# Patient Record
Sex: MALE | URBAN - METROPOLITAN AREA
[De-identification: names, ages, dates, MRNs, and addresses within clinical notes are randomized per-mention and may not be internally consistent; named-entity substitution may affect disease eponyms.]

---

## 2018-02-01 ENCOUNTER — APPOINTMENT (RX ONLY)
Dept: URBAN - METROPOLITAN AREA CLINIC 79 | Facility: CLINIC | Age: 78
Setting detail: DERMATOLOGY
End: 2018-02-01

## 2018-02-01 DIAGNOSIS — Z48.02 ENCOUNTER FOR REMOVAL OF SUTURES: ICD-10-CM

## 2018-02-01 PROBLEM — Z85.828 PERSONAL HISTORY OF OTHER MALIGNANT NEOPLASM OF SKIN: Status: ACTIVE | Noted: 2018-02-01

## 2018-02-01 PROCEDURE — 99024 POSTOP FOLLOW-UP VISIT: CPT

## 2018-02-01 PROCEDURE — ? SUTURE REMOVAL (GLOBAL PERIOD)

## 2018-02-01 ASSESSMENT — LOCATION DETAILED DESCRIPTION DERM
LOCATION DETAILED: RIGHT INFERIOR POSTERIOR NECK
LOCATION DETAILED: RIGHT INFERIOR POSTERIOR NECK

## 2018-02-01 ASSESSMENT — LOCATION SIMPLE DESCRIPTION DERM
LOCATION SIMPLE: POSTERIOR NECK
LOCATION SIMPLE: POSTERIOR NECK

## 2018-02-01 ASSESSMENT — LOCATION ZONE DERM
LOCATION ZONE: NECK
LOCATION ZONE: NECK

## 2018-02-01 NOTE — PROCEDURE: SUTURE REMOVAL (GLOBAL PERIOD)
Add 29886 Cpt? (Important Note: In 2017 The Use Of 80956 Is Being Tracked By Cms To Determine Future Global Period Reimbursement For Global Periods): yes
Body Location Override (Optional - Billing Will Still Be Based On Selected Body Map Location If Applicable): right neck
Detail Level: Detailed

## 2018-02-13 ENCOUNTER — APPOINTMENT (RX ONLY)
Dept: URBAN - METROPOLITAN AREA CLINIC 79 | Facility: CLINIC | Age: 78
Setting detail: DERMATOLOGY
End: 2018-02-13

## 2018-02-13 VITALS — HEART RATE: 53 BPM | SYSTOLIC BLOOD PRESSURE: 142 MMHG | DIASTOLIC BLOOD PRESSURE: 80 MMHG

## 2018-02-13 PROBLEM — C44.319 BASAL CELL CARCINOMA OF SKIN OF OTHER PARTS OF FACE: Status: ACTIVE | Noted: 2018-02-13

## 2018-02-13 PROCEDURE — ? MOHS SURGERY

## 2018-02-13 PROCEDURE — 14041 TIS TRNFR F/C/C/M/N/A/G/H/F: CPT

## 2018-02-13 PROCEDURE — 17312 MOHS ADDL STAGE: CPT

## 2018-02-13 PROCEDURE — 17311 MOHS 1 STAGE H/N/HF/G: CPT

## 2018-02-13 NOTE — HPI: PROCEDURE (MOHS)
Has The Growth Been Previously Biopsied?: has been previously biopsied
Body Location Override (Optional): Right lateral forehead

## 2018-02-13 NOTE — PROCEDURE: MOHS SURGERY
Advancement Flap (Single) Text: Given the location of the defect and the proximity to free margins a single advancement flap was deemed most appropriate.  Using a sterile surgical marker, an appropriate advancement flap was drawn incorporating the defect and placing the expected incisions within the relaxed skin tension lines where possible.    The area thus outlined was incised deep to adipose tissue with a #15 scalpel blade.  The skin margins were undermined to an appropriate distance in all directions utilizing iris scissors.

## 2018-02-13 NOTE — PROCEDURE: MOHS SURGERY
V-Y Flap Text: Given the location of the defect, shape of the defect and the proximity to free margins a V-Y flap was deemed most appropriate.  Using a sterile surgical marker, an appropriate advancement flap was drawn incorporating the defect and placing the expected incisions within the relaxed skin tension lines where possible.    The area thus outlined was incised deep to adipose tissue with a #15 scalpel blade.  The skin margins were undermined to an appropriate distance in all directions utilizing iris scissors.

## 2018-02-13 NOTE — PROCEDURE: MOHS SURGERY
Burow's Advancement Flap Text: Given the location of the defect and the proximity to free margins a Burow's advancement flap was deemed most appropriate.  Using a sterile surgical marker, the appropriate advancement flap was drawn incorporating the defect and placing the expected incisions within the relaxed skin tension lines where possible.    The area thus outlined was incised deep to adipose tissue with a #15 scalpel blade.  The skin margins were undermined to an appropriate distance in all directions utilizing iris scissors.

## 2018-02-13 NOTE — PROCEDURE: MOHS SURGERY
Mohs Method Verbiage: An incision at a 90 degree angle following the standard Mohs approach was done and the specimen was harvested as a microscopic controlled layer.

## 2018-02-13 NOTE — PROCEDURE: MOHS SURGERY
Body Location Override (Optional - Billing Will Still Be Based On Selected Body Map Location If Applicable): right lateral forehead

## 2018-02-13 NOTE — PROCEDURE: MOHS SURGERY
O-L Flap Text: Given the location of the defect, shape of the defect and the proximity to free margins an O-L flap was deemed most appropriate.  Using a sterile surgical marker, an appropriate advancement flap was drawn incorporating the defect and placing the expected incisions within the relaxed skin tension lines where possible.    The area thus outlined was incised deep to adipose tissue with a #15 scalpel blade.  The skin margins were undermined to an appropriate distance in all directions utilizing iris scissors.

## 2018-02-13 NOTE — PROCEDURE: MOHS SURGERY
Crescentic Advancement Flap Text: Given the location of the defect and the proximity to free margins a crescentic advancement flap was deemed most appropriate.  Using a sterile surgical marker, the appropriate advancement flap was drawn incorporating the defect and placing the expected incisions within the relaxed skin tension lines where possible.    The area thus outlined was incised deep to adipose tissue with a #15 scalpel blade.  The skin margins were undermined to an appropriate distance in all directions utilizing iris scissors.

## 2018-02-13 NOTE — PROCEDURE: MOHS SURGERY
Consent 1/Introductory Paragraph: The rationale for Mohs was explained to the patient and consent was obtained. The risks, benefits and alternatives to therapy were discussed in detail. Specifically, the risks of infection, scarring, bleeding, prolonged wound healing, incomplete removal, allergy to anesthesia, nerve injury and recurrence were addressed. Prior to the procedure, the treatment site was clearly identified and confirmed by the patient using a mirror. All components of Universal Protocol/PAUSE Rule completed.

## 2018-02-13 NOTE — PROCEDURE: MOHS SURGERY
Advancement Flap (Double) Text: Given the location of the defect and the proximity to free margins a double advancement flap was deemed most appropriate.  Using a sterile surgical marker, the appropriate advancement flaps were drawn incorporating the defect and placing the expected incisions within the relaxed skin tension lines where possible.    The area thus outlined was incised deep to adipose tissue with a #15 scalpel blade.  The skin margins were undermined to an appropriate distance in all directions utilizing iris scissors.

## 2018-07-02 ENCOUNTER — NEW PATIENT (OUTPATIENT)
Dept: URBAN - METROPOLITAN AREA CLINIC 64 | Facility: CLINIC | Age: 78
End: 2018-07-02
Payer: COMMERCIAL

## 2018-07-02 DIAGNOSIS — H52.03 HYPERMETROPIA, BILATERAL: ICD-10-CM

## 2018-07-02 DIAGNOSIS — H25.13 AGE-RELATED NUCLEAR CATARACT, BILATERAL: Primary | ICD-10-CM

## 2018-07-02 PROCEDURE — 92015 DETERMINE REFRACTIVE STATE: CPT | Performed by: OPTOMETRIST

## 2018-07-02 PROCEDURE — 92002 INTRM OPH EXAM NEW PATIENT: CPT | Performed by: OPTOMETRIST

## 2018-07-02 ASSESSMENT — VISUAL ACUITY
OD: 20/20
OS: 20/20

## 2018-07-02 ASSESSMENT — INTRAOCULAR PRESSURE
OS: 14
OD: 13

## 2019-01-01 NOTE — PROCEDURE: MOHS SURGERY
0 Trilobed Flap Text: The defect edges were debeveled with a #15 scalpel blade.  Given the location of the defect and the proximity to free margins a trilobed flap was deemed most appropriate.  Using a sterile surgical marker, an appropriate trilobed flap drawn around the defect.    The area thus outlined was incised deep to adipose tissue with a #15 scalpel blade.  The skin margins were undermined to an appropriate distance in all directions utilizing iris scissors.

## 2019-05-16 ENCOUNTER — HISTORICAL (OUTPATIENT)
Dept: RADIOLOGY | Facility: HOSPITAL | Age: 79
End: 2019-05-16

## 2019-08-29 ENCOUNTER — HISTORICAL (OUTPATIENT)
Dept: RADIOLOGY | Facility: HOSPITAL | Age: 79
End: 2019-08-29

## 2021-02-03 ENCOUNTER — HISTORICAL (OUTPATIENT)
Dept: ADMINISTRATIVE | Facility: HOSPITAL | Age: 81
End: 2021-02-03

## 2022-04-11 ENCOUNTER — HISTORICAL (OUTPATIENT)
Dept: ADMINISTRATIVE | Facility: HOSPITAL | Age: 82
End: 2022-04-11
Payer: MEDICARE

## 2022-04-27 VITALS
DIASTOLIC BLOOD PRESSURE: 86 MMHG | HEIGHT: 69 IN | SYSTOLIC BLOOD PRESSURE: 139 MMHG | BODY MASS INDEX: 23.99 KG/M2 | WEIGHT: 162 LBS

## 2022-05-16 NOTE — PROGRESS NOTES
Subjective:       Patient ID: Aleisha Hernandez is a 81 y.o. male.    Chief Complaint: Dizziness (Would like referral for orthopedic)    HPI   New patient, presents to the clinic to establish primary care.  Past medical, family and social histories reviewed.  Patient has a couple of issues today.  First, is having some dizziness, feelings of being off balance, very much sounds like vertigo, off and on for some time.  Also has tinnitus.  Reports no hearing loss, no syncopal episodes, no headache.  No past history of vertigo.  Also, has chronic left knee issues, mostly associated with his patella.  Has had significant effusions of his knee recently, had to have a drain that urgent care.  Had cortisone injection into the knee space, not very much improvement.  Otherwise, no chronic medical conditions or complaints today.    Review of Systems   Constitutional: Negative.  Negative for fatigue and fever.   HENT: Negative.  Negative for sore throat and trouble swallowing.    Eyes: Negative.  Negative for redness and visual disturbance.   Respiratory: Negative.  Negative for chest tightness and shortness of breath.    Cardiovascular: Negative.  Negative for chest pain.   Gastrointestinal: Negative.  Negative for abdominal pain, blood in stool, change in bowel habit, nausea and change in bowel habit.   Endocrine: Negative.  Negative for cold intolerance, heat intolerance and polyuria.   Genitourinary: Negative.    Musculoskeletal: Negative.  Negative for arthralgias, gait problem and joint swelling.   Integumentary:  Negative for rash. Negative.   Neurological: Negative.  Negative for dizziness, weakness and headaches.   Psychiatric/Behavioral: Negative.  Negative for agitation and dysphoric mood.         Objective:     Vitals:    05/17/22 1350   BP: 132/77   BP Location: Left arm   Patient Position: Sitting   BP Method: Small (Automatic)   Pulse: (!) 57   Resp: 18   Temp: 97 °F (36.1 °C)   TempSrc: Tympanic   SpO2: 98%  "  Weight: 73 kg (161 lb)   Height: 5' 9" (1.753 m)   Body mass index is 23.78 kg/m².     Physical Exam  Constitutional:       Appearance: Normal appearance.   Eyes:      Conjunctiva/sclera: Conjunctivae normal.   Cardiovascular:      Rate and Rhythm: Normal rate and regular rhythm.   Pulmonary:      Effort: Pulmonary effort is normal.      Breath sounds: Normal breath sounds.   Skin:     General: Skin is warm and dry.   Neurological:      Mental Status: He is alert.   Psychiatric:         Mood and Affect: Mood normal.         Behavior: Behavior normal.           Lab Results   Component Value Date    WBC 11.5 03/22/2019    RBC 2.55 (L) 03/22/2019    HGB 8.4 (L) 03/23/2019    HCT 26.1 (L) 03/23/2019    MCV 90.6 03/22/2019    MCH 29.0 03/22/2019     03/22/2019     03/22/2019    K 4.5 03/22/2019    BUN 11.0 03/22/2019    CREATININE 0.83 03/22/2019    AST 15 03/22/2019    ALT 14 03/22/2019    BILITOT 0.3 03/22/2019     Assessment:     Problem List Items Addressed This Visit    None     Visit Diagnoses     Establishing care with new doctor, encounter for    -  Primary    Chronic pain of left knee        Relevant Orders    Ambulatory referral/consult to Orthopedics    Vertigo        Relevant Orders    Ambulatory referral/consult to ENT    Tinnitus, unspecified laterality               Plan:           Will place 2 referrals, 1st to Orthopedics, Dr. Torres for his chronic left knee pain.  Second referral will be to otolaryngology, Dr. Madera for his vertigo.  Next wellness exam will be in 6 months with pre visit labs.  "

## 2022-05-17 ENCOUNTER — OFFICE VISIT (OUTPATIENT)
Dept: PRIMARY CARE CLINIC | Facility: CLINIC | Age: 82
End: 2022-05-17
Payer: MEDICARE

## 2022-05-17 VITALS
HEIGHT: 69 IN | RESPIRATION RATE: 18 BRPM | OXYGEN SATURATION: 98 % | WEIGHT: 161 LBS | BODY MASS INDEX: 23.85 KG/M2 | HEART RATE: 57 BPM | SYSTOLIC BLOOD PRESSURE: 132 MMHG | TEMPERATURE: 97 F | DIASTOLIC BLOOD PRESSURE: 77 MMHG

## 2022-05-17 DIAGNOSIS — Z76.89 ESTABLISHING CARE WITH NEW DOCTOR, ENCOUNTER FOR: Primary | ICD-10-CM

## 2022-05-17 DIAGNOSIS — R42 DIZZINESS: ICD-10-CM

## 2022-05-17 DIAGNOSIS — Z13.0 SCREENING FOR DEFICIENCY ANEMIA: ICD-10-CM

## 2022-05-17 DIAGNOSIS — Z00.00 WELLNESS EXAMINATION: Primary | ICD-10-CM

## 2022-05-17 DIAGNOSIS — Z12.5 SCREENING FOR PROSTATE CANCER: ICD-10-CM

## 2022-05-17 DIAGNOSIS — H93.19 TINNITUS, UNSPECIFIED LATERALITY: ICD-10-CM

## 2022-05-17 DIAGNOSIS — M25.562 CHRONIC PAIN OF LEFT KNEE: ICD-10-CM

## 2022-05-17 DIAGNOSIS — Z13.1 SCREENING FOR DIABETES MELLITUS: ICD-10-CM

## 2022-05-17 DIAGNOSIS — R42 VERTIGO: ICD-10-CM

## 2022-05-17 DIAGNOSIS — G89.29 CHRONIC PAIN OF LEFT KNEE: ICD-10-CM

## 2022-05-17 DIAGNOSIS — Z13.6 SCREENING FOR CARDIOVASCULAR CONDITION: ICD-10-CM

## 2022-05-17 DIAGNOSIS — Z13.29 SCREENING FOR THYROID DISORDER: ICD-10-CM

## 2022-05-17 PROCEDURE — 99214 PR OFFICE/OUTPT VISIT, EST, LEVL IV, 30-39 MIN: ICD-10-PCS | Mod: ,,, | Performed by: GENERAL PRACTICE

## 2022-05-17 PROCEDURE — 99214 OFFICE O/P EST MOD 30 MIN: CPT | Mod: ,,, | Performed by: GENERAL PRACTICE

## 2022-05-17 RX ORDER — MELOXICAM 7.5 MG/1
TABLET ORAL
COMMUNITY
Start: 2022-05-06 | End: 2023-09-27

## 2022-05-17 RX ORDER — ACYCLOVIR 200 MG/1
CAPSULE ORAL
COMMUNITY
Start: 2022-05-16

## 2022-05-25 ENCOUNTER — TELEPHONE (OUTPATIENT)
Dept: PRIMARY CARE CLINIC | Facility: CLINIC | Age: 82
End: 2022-05-25
Payer: MEDICARE

## 2022-05-25 NOTE — TELEPHONE ENCOUNTER
----- Message from Neena Franco sent at 5/25/2022  8:39 AM CDT -----  Regarding: Advice  Type:  Needs Medical Advice    Who Called: Patient  Symptoms (please be specific):    How long has patient had these symptoms:    Pharmacy name and phone #:    Would the patient rather a call back or a response via [facility]? Call  Best Call Back Number: [phone]  Additional Information: Dr Arreaga referred pt to Dr Valderrama for his knee and he states he would like a second opinion. He states he saw Dr Valderrama before and he is the one who said his knee was inoperable.

## 2022-05-25 NOTE — TELEPHONE ENCOUNTER
Spoke to patient regarding message. I stated that I sent a new referral to Dr. Dieter Torres. He spoke understanding a will wait to hear from them to schedule an appointment

## 2022-10-11 ENCOUNTER — TELEPHONE (OUTPATIENT)
Dept: PRIMARY CARE CLINIC | Facility: CLINIC | Age: 82
End: 2022-10-11
Payer: MEDICARE

## 2022-10-11 NOTE — TELEPHONE ENCOUNTER
Pt does have wellness in November. NO sooner appt. Have pt's name and # down if someone cancels for sooner appt. Pt was told if not better to go got [facility] [place] or call us.

## 2022-10-12 ENCOUNTER — TELEPHONE (OUTPATIENT)
Dept: PRIMARY CARE CLINIC | Facility: CLINIC | Age: 82
End: 2022-10-12
Payer: MEDICARE
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
## 2022-12-15 ENCOUNTER — PATIENT MESSAGE (OUTPATIENT)
Dept: RESEARCH | Facility: HOSPITAL | Age: 82
End: 2022-12-15
Payer: MEDICARE

## 2023-09-20 ENCOUNTER — TELEPHONE (OUTPATIENT)
Dept: PRIMARY CARE CLINIC | Facility: CLINIC | Age: 83
End: 2023-09-20
Payer: MEDICARE

## 2023-09-20 RX ORDER — FLUOROURACIL 50 MG/G
1 CREAM TOPICAL 2 TIMES DAILY
COMMUNITY
Start: 2023-09-20

## 2023-09-20 NOTE — TELEPHONE ENCOUNTER
Are there any outstanding task in patient chart?  N     2. Do we have outstanding/pending referrals?  N     3. Has the patient been seen in an ER, Urgent Care, or admitted since last visit?  N     4. Has patient seen any other healthcare providers since last visit?  N     5. Has patient had any blood work or xrays done since last visit?  N   6. Is the patient's pneumonia vaccine current?  Prevnar 13:11/3/20  Pneumonia 20:n/a  Pneumonia 23:11/9/21    7. When was the patient's colonoscopy?  N/a  8. When was the patient's last mamogram?  N/a  9. When was the patient's last cervical screening/PAP smear?  N/a  10. When was the patient's last bone scan?  N/a  11. When was the patient's last diabetic screening?  A1c: n/a  Micro:n/a  Eye Exam: n/a

## 2023-09-27 ENCOUNTER — OFFICE VISIT (OUTPATIENT)
Dept: PRIMARY CARE CLINIC | Facility: CLINIC | Age: 83
End: 2023-09-27
Payer: MEDICARE

## 2023-09-27 VITALS
TEMPERATURE: 98 F | OXYGEN SATURATION: 95 % | WEIGHT: 159.63 LBS | BODY MASS INDEX: 23.64 KG/M2 | SYSTOLIC BLOOD PRESSURE: 126 MMHG | DIASTOLIC BLOOD PRESSURE: 78 MMHG | HEART RATE: 89 BPM | RESPIRATION RATE: 20 BRPM | HEIGHT: 69 IN

## 2023-09-27 DIAGNOSIS — N40.0 BENIGN PROSTATIC HYPERPLASIA WITHOUT LOWER URINARY TRACT SYMPTOMS: ICD-10-CM

## 2023-09-27 DIAGNOSIS — Z85.828 HISTORY OF MOHS MICROGRAPHIC SURGERY FOR SKIN CANCER: ICD-10-CM

## 2023-09-27 DIAGNOSIS — Z86.2 HISTORY OF ANEMIA: ICD-10-CM

## 2023-09-27 DIAGNOSIS — E78.5 HYPERLIPIDEMIA, UNSPECIFIED HYPERLIPIDEMIA TYPE: ICD-10-CM

## 2023-09-27 DIAGNOSIS — R79.9 ABNORMAL BLOOD CHEMISTRY: ICD-10-CM

## 2023-09-27 DIAGNOSIS — Z76.89 ENCOUNTER TO ESTABLISH CARE WITH NEW DOCTOR: Primary | ICD-10-CM

## 2023-09-27 DIAGNOSIS — Z98.890 HISTORY OF MOHS MICROGRAPHIC SURGERY FOR SKIN CANCER: ICD-10-CM

## 2023-09-27 DIAGNOSIS — R73.09 ABNORMAL GLUCOSE LEVEL: ICD-10-CM

## 2023-09-27 DIAGNOSIS — Z00.00 MEDICARE ANNUAL WELLNESS VISIT, SUBSEQUENT: ICD-10-CM

## 2023-09-27 PROCEDURE — 99203 PR OFFICE/OUTPT VISIT, NEW, LEVL III, 30-44 MIN: ICD-10-PCS | Mod: ,,, | Performed by: STUDENT IN AN ORGANIZED HEALTH CARE EDUCATION/TRAINING PROGRAM

## 2023-09-27 PROCEDURE — 99203 OFFICE O/P NEW LOW 30 MIN: CPT | Mod: ,,, | Performed by: STUDENT IN AN ORGANIZED HEALTH CARE EDUCATION/TRAINING PROGRAM
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
## 2023-10-11 ENCOUNTER — LAB VISIT (OUTPATIENT)
Dept: LAB | Facility: HOSPITAL | Age: 83
End: 2023-10-11
Attending: STUDENT IN AN ORGANIZED HEALTH CARE EDUCATION/TRAINING PROGRAM
Payer: MEDICARE

## 2023-10-11 DIAGNOSIS — R73.09 ABNORMAL GLUCOSE LEVEL: ICD-10-CM

## 2023-10-11 DIAGNOSIS — R79.9 ABNORMAL BLOOD CHEMISTRY: ICD-10-CM

## 2023-10-11 DIAGNOSIS — Z86.2 HISTORY OF ANEMIA: ICD-10-CM

## 2023-10-11 DIAGNOSIS — E78.5 HYPERLIPIDEMIA, UNSPECIFIED HYPERLIPIDEMIA TYPE: ICD-10-CM

## 2023-10-11 DIAGNOSIS — Z00.00 MEDICARE ANNUAL WELLNESS VISIT, SUBSEQUENT: ICD-10-CM

## 2023-10-11 LAB
ALBUMIN SERPL-MCNC: 3.7 G/DL (ref 3.4–4.8)
ALBUMIN/GLOB SERPL: 1.5 RATIO (ref 1.1–2)
ALP SERPL-CCNC: 56 UNIT/L (ref 40–150)
ALT SERPL-CCNC: 14 UNIT/L (ref 0–55)
APPEARANCE UR: CLEAR
AST SERPL-CCNC: 18 UNIT/L (ref 5–34)
BACTERIA #/AREA URNS AUTO: NORMAL /HPF
BASOPHILS # BLD AUTO: 0.05 X10(3)/MCL
BASOPHILS NFR BLD AUTO: 0.8 %
BILIRUB SERPL-MCNC: 0.7 MG/DL
BILIRUB UR QL STRIP.AUTO: NEGATIVE
BUN SERPL-MCNC: 17.5 MG/DL (ref 8.4–25.7)
CALCIUM SERPL-MCNC: 8.6 MG/DL (ref 8.8–10)
CHLORIDE SERPL-SCNC: 107 MMOL/L (ref 98–107)
CHOLEST SERPL-MCNC: 238 MG/DL
CHOLEST/HDLC SERPL: 4 {RATIO} (ref 0–5)
CO2 SERPL-SCNC: 27 MMOL/L (ref 23–31)
COLOR UR AUTO: YELLOW
CREAT SERPL-MCNC: 0.96 MG/DL (ref 0.73–1.18)
EOSINOPHIL # BLD AUTO: 0.15 X10(3)/MCL (ref 0–0.9)
EOSINOPHIL NFR BLD AUTO: 2.3 %
ERYTHROCYTE [DISTWIDTH] IN BLOOD BY AUTOMATED COUNT: 14.9 % (ref 11.5–17)
EST. AVERAGE GLUCOSE BLD GHB EST-MCNC: 108.3 MG/DL
GFR SERPLBLD CREATININE-BSD FMLA CKD-EPI: >60 MLS/MIN/1.73/M2
GLOBULIN SER-MCNC: 2.4 GM/DL (ref 2.4–3.5)
GLUCOSE SERPL-MCNC: 92 MG/DL (ref 82–115)
GLUCOSE UR QL STRIP.AUTO: NEGATIVE
HBA1C MFR BLD: 5.4 %
HCT VFR BLD AUTO: 43.1 % (ref 42–52)
HDLC SERPL-MCNC: 58 MG/DL (ref 35–60)
HGB BLD-MCNC: 13.7 G/DL (ref 14–18)
IMM GRANULOCYTES # BLD AUTO: 0.03 X10(3)/MCL (ref 0–0.04)
IMM GRANULOCYTES NFR BLD AUTO: 0.5 %
KETONES UR QL STRIP.AUTO: NEGATIVE
LDLC SERPL CALC-MCNC: 165 MG/DL (ref 50–140)
LEUKOCYTE ESTERASE UR QL STRIP.AUTO: NEGATIVE
LYMPHOCYTES # BLD AUTO: 2.3 X10(3)/MCL (ref 0.6–4.6)
LYMPHOCYTES NFR BLD AUTO: 35 %
MCH RBC QN AUTO: 29.8 PG (ref 27–31)
MCHC RBC AUTO-ENTMCNC: 31.8 G/DL (ref 33–36)
MCV RBC AUTO: 93.7 FL (ref 80–94)
MONOCYTES # BLD AUTO: 0.63 X10(3)/MCL (ref 0.1–1.3)
MONOCYTES NFR BLD AUTO: 9.6 %
NEUTROPHILS # BLD AUTO: 3.41 X10(3)/MCL (ref 2.1–9.2)
NEUTROPHILS NFR BLD AUTO: 51.8 %
NITRITE UR QL STRIP.AUTO: NEGATIVE
NRBC BLD AUTO-RTO: 0 %
PH UR STRIP.AUTO: 6 [PH]
PLATELET # BLD AUTO: 234 X10(3)/MCL (ref 130–400)
PMV BLD AUTO: 13.1 FL (ref 7.4–10.4)
POTASSIUM SERPL-SCNC: 4.6 MMOL/L (ref 3.5–5.1)
PROT SERPL-MCNC: 6.1 GM/DL (ref 5.8–7.6)
PROT UR QL STRIP.AUTO: NEGATIVE
RBC # BLD AUTO: 4.6 X10(6)/MCL (ref 4.7–6.1)
RBC #/AREA URNS AUTO: <5 /HPF
RBC UR QL AUTO: NEGATIVE
SODIUM SERPL-SCNC: 140 MMOL/L (ref 136–145)
SP GR UR STRIP.AUTO: 1.02 (ref 1–1.03)
SQUAMOUS #/AREA URNS AUTO: <5 /HPF
TRIGL SERPL-MCNC: 76 MG/DL (ref 34–140)
TSH SERPL-ACNC: 2.36 UIU/ML (ref 0.35–4.94)
UROBILINOGEN UR STRIP-ACNC: 0.2
VLDLC SERPL CALC-MCNC: 15 MG/DL
WBC # SPEC AUTO: 6.57 X10(3)/MCL (ref 4.5–11.5)
WBC #/AREA URNS AUTO: <5 /HPF

## 2023-10-11 PROCEDURE — 83036 HEMOGLOBIN GLYCOSYLATED A1C: CPT

## 2023-10-11 PROCEDURE — 85025 COMPLETE CBC W/AUTO DIFF WBC: CPT

## 2023-10-11 PROCEDURE — 36415 COLL VENOUS BLD VENIPUNCTURE: CPT

## 2023-10-11 PROCEDURE — 81001 URINALYSIS AUTO W/SCOPE: CPT

## 2023-10-11 PROCEDURE — 84443 ASSAY THYROID STIM HORMONE: CPT

## 2023-10-11 PROCEDURE — 80061 LIPID PANEL: CPT

## 2023-10-11 PROCEDURE — 80053 COMPREHEN METABOLIC PANEL: CPT

## 2023-10-31 ENCOUNTER — OFFICE VISIT (OUTPATIENT)
Dept: PRIMARY CARE CLINIC | Facility: CLINIC | Age: 83
End: 2023-10-31
Payer: MEDICARE

## 2023-10-31 VITALS
WEIGHT: 162.81 LBS | HEIGHT: 69 IN | HEART RATE: 60 BPM | BODY MASS INDEX: 24.11 KG/M2 | TEMPERATURE: 98 F | RESPIRATION RATE: 17 BRPM | SYSTOLIC BLOOD PRESSURE: 158 MMHG | DIASTOLIC BLOOD PRESSURE: 86 MMHG | OXYGEN SATURATION: 99 %

## 2023-10-31 DIAGNOSIS — G47.62 NOCTURNAL LEG CRAMPS: ICD-10-CM

## 2023-10-31 DIAGNOSIS — Z00.00 MEDICARE ANNUAL WELLNESS VISIT, SUBSEQUENT: Primary | ICD-10-CM

## 2023-10-31 DIAGNOSIS — R03.0 ELEVATED BLOOD PRESSURE READING WITHOUT DIAGNOSIS OF HYPERTENSION: ICD-10-CM

## 2023-10-31 PROCEDURE — G0439 PPPS, SUBSEQ VISIT: HCPCS | Mod: ,,, | Performed by: STUDENT IN AN ORGANIZED HEALTH CARE EDUCATION/TRAINING PROGRAM

## 2023-10-31 PROCEDURE — G0439 PR MEDICARE ANNUAL WELLNESS SUBSEQUENT VISIT: ICD-10-PCS | Mod: ,,, | Performed by: STUDENT IN AN ORGANIZED HEALTH CARE EDUCATION/TRAINING PROGRAM

## 2023-10-31 RX ORDER — GABAPENTIN 100 MG/1
100 CAPSULE ORAL NIGHTLY
Qty: 30 CAPSULE | Refills: 11 | Status: SHIPPED | OUTPATIENT
Start: 2023-10-31 | End: 2023-11-10 | Stop reason: SINTOL

## 2023-10-31 NOTE — PROCEDURE: MOHS SURGERY
Chief complaint:   Chief Complaint   Patient presents with   • Follow-up     Fuv lashonda       Vitals:  Visit Vitals  /82   Pulse 87   Wt (!) 138.3 kg (305 lb)   SpO2 99%   BMI 41.37 kg/m²       HISTORY OF PRESENT ILLNESS     HPI   The patient presents for evaluation of his obstructive sleep apnea.  He was diagnosed with severe sleep apnea in 2022 with a respiratory event index of 36.1 and a minimum oxygen saturation of 79%.  Is being treated with auto-titrating CPAP +5-15 cm with expiratory pressure relief of 3 cm through a nasal pillow interface.  He states that treatment is going well and he denies mask or mouth leaks. He is not aware of snoring and is not told that he does. He feels a little better but expected to feel better than he does. He goes to bed at 10:30-11 PM and he awakens for the day at 7:15 AM. He can wake up at 9 AM on weekends but does not feel better with sleep extension.     CPAP/BiPAP DOWNLOAD DATA   % use > 4 hours 99%   AHI 0.6   Mean Usage (time in hours) 9:07    Other significant problems:  Patient Active Problem List    Diagnosis Date Noted   • LASHONDA (obstructive sleep apnea) 08/01/2022     Priority: Medium   • Impaired fasting glucose 08/30/2023     Priority: Low   • Racing heart beat 08/30/2023     Priority: Low   • Obesity, morbid, BMI 40.0-49.9 (CMD) 01/29/2018     Priority: Low   • Essential hypertension 01/16/2018     Priority: Low       PAST MEDICAL, FAMILY AND SOCIAL HISTORY     Medications:  Current Outpatient Medications   Medication Sig Dispense Refill   • losartan (COZAAR) 100 MG tablet Take 1 tablet by mouth daily. 90 tablet 3   • hydroCHLOROthiazide (HYDRODIURIL) 25 MG tablet Take 1 tablet by mouth daily. 90 tablet 3   • amLODIPine (NORVASC) 10 MG tablet Take 1 tablet by mouth daily. 90 tablet 3   • propRANolol (INDERAL LA) 60 MG 24 hr capsule Take 1 capsule by mouth nightly. 30 capsule 5     No current facility-administered medications for this visit.        Allergies:  ALLERGIES:  No Known Allergies    Past Medical  History/Surgeries:  Past Medical History:   Diagnosis Date   • BREE (obstructive sleep apnea)        No past surgical history on file.    Family History:  Family History   Problem Relation Age of Onset   • Patient is unaware of any medical problems Mother    • Patient is unaware of any medical problems Father        Social History:  Social History     Tobacco Use   • Smoking status: Never     Passive exposure: Never   • Smokeless tobacco: Never   Substance Use Topics   • Alcohol use: Yes     Alcohol/week: 0.0 standard drinks of alcohol       REVIEW OF SYSTEMS     Review of Systems    PHYSICAL EXAM     Physical Exam   The patient is an obese male in no acute distress.  He appears alert and oriented x3.  He has patent nasopharyngeal passages.  There is a Mallampati 3 appearance of the posterior pharynx with encroachment of the posterior oral airway.  He has atrophic tonsils and no mandibular retrognathia.    ASSESSMENT/PLAN     Obstructive sleep apnea.  The patient has an excellent response to CPAP treatment.  He is compliant with his machine and is symptomatically improved.  Based on his download, I would like to change his pressures to +7-13 cm.  I will renew his supply order and see him back in 1 year long-term evaluation.  Was told to keep track of his symptoms of daytime sleepiness and if they worsen, we could consider using non amphetamine stimulants but he does not require these medications at present.   Ear Wedge Repair Text: A wedge excision was completed by carrying down an excision through the full thickness of the ear and cartilage with an inward facing Burow's triangle. The wound was then closed in a layered fashion.

## 2023-10-31 NOTE — ASSESSMENT & PLAN NOTE
Reviewed recent wellness labs. See below for health maintenance.    Vaccinations:   - Flu: received 9/5/23   - Pneumonia: PCV13 2020, PPSV23 2021   - Shingles (>50): pt reports receiving x 2 2018   - Tdap: pt reports receiving 2020 for trip to [place]   - COVID: received original series  Screening:   - Prostate (>45):    - Lung Ca. Screening (50-80 with >20 pack yrs current or quit <15 yrs):    - Colon Ca. Screening (>45): pt thinks last c-scope was at age 72 and was told no further needed, pt says no polyps   - AAA (65-75 if ever smoked): n/a

## 2023-10-31 NOTE — PROGRESS NOTES
Patient ID: [id number]     Chief Complaint: Medicare AWV    HPI:     Aleisha Hernandez is a 83 y.o. male here today for a Medicare Wellness.     Takes zolpidem 10 mg PRN (bought in [place]). He only uses when leg cramps are waking him up multiple nights in a row. Only uses one every few months. Has been dealing with this issue 8-10 yrs. Has tried prescriptions for cramps in past w/o success. Has tried multiple supplements w/o success. Tried tonic water w/o success. Eating tsp of mustard helps.    Opioid Screening: Patient medication list reviewed, patient is not taking prescription opioids. Patient is not using additional opioids than prescribed. Patient is at low risk of substance abuse based on this opioid use history.     No past medical history on file.     Past Surgical History:   Procedure Laterality Date    ANTERIOR CRUCIATE LIGAMENT REPAIR      Shoulder    KNEE ARTHROSCOPY Right     PROSTATE SURGERY         Review of patient's allergies indicates:   Allergen Reactions    Codeine Itching, Palpitations, Rash and Shortness Of Breath       No outpatient medications have been marked as taking for the 10/31/23 encounter (Office Visit) with Murphy Canales MD.       Social History     Socioeconomic History    Marital status:    Tobacco Use    Smoking status: Never    Smokeless tobacco: Never   Substance and Sexual Activity    Alcohol use: Yes     Alcohol/week: 1.0 standard drink of alcohol     Types: 1 Shots of liquor per week    Drug use: Never    Sexual activity: Not Currently     Social Determinants of Health     Financial Resource Strain: Low Risk  (10/31/2023)    Overall Financial Resource Strain (CARDIA)     Difficulty of Paying Living Expenses: Not hard at all   Food Insecurity: No Food Insecurity (10/31/2023)    Hunger Vital Sign     Worried About Running Out of Food in the Last Year: Never true     Ran Out of Food in the Last Year: Never true   Transportation Needs: No Transportation Needs  (10/31/2023)    PRAPARE - Transportation     Lack of Transportation (Medical): No     Lack of Transportation (Non-Medical): No   Physical Activity: Insufficiently Active (10/31/2023)    Exercise Vital Sign     Days of Exercise per Week: 3 days     Minutes of Exercise per Session: 20 min   Stress: No Stress Concern Present (10/31/2023)    [employer] [employer] - Occupational Stress Questionnaire     Feeling of Stress : Not at all   Social Connections: Unknown (10/31/2023)    Social Connection and Isolation Panel [NHANES]     Frequency of Communication with Friends and Family: Three times a week     Frequency of Social Gatherings with Friends and Family: Three times a week   Housing Stability: Unknown (10/31/2023)    Housing Stability Vital Sign     Unable to Pay for Housing in the Last Year: No     Number of Places Lived in the Last Year: 1        History reviewed. No pertinent family history.     Patient Care Team:  Murphy Canales MD as PCP - General (Internal Medicine)       Subjective:     Review of Systems   Constitutional:  Negative for chills and fever.   HENT:  Negative for sinus pressure/congestion and sore throat.    Respiratory:  Negative for cough, shortness of breath and wheezing.    Cardiovascular:  Negative for chest pain and leg swelling.   Gastrointestinal:  Negative for abdominal pain, nausea and vomiting.   Genitourinary:  Negative for dysuria and hematuria.   Musculoskeletal:  Positive for leg pain (nocturnal leg cramps). Negative for arthralgias and myalgias.   Integumentary:  Negative for rash.   Neurological:  Negative for dizziness and syncope.   Hematological:  Negative for adenopathy.   Psychiatric/Behavioral:  Negative for confusion. The patient is not nervous/anxious.        Patient Reported Health Risk Assessment  What is your age?: 80 or older  Are you male or female?: Male  During the past four weeks, how much have you been bothered by emotional problems such as  feeling anxious, depressed, irritable, sad, or downhearted and blue?: Not at all  During the past five weeks, has your physical and/or emotional health limited your social activities with family, friends, neighbors, or groups?: Not at all  During the past four weeks, how much bodily pain have you generally had?: Very mild pain  During the past four weeks, was someone available to help if you needed and wanted help?: Yes, as much as I wanted  During the past four weeks, what was the hardest physical activity you could do for at least two minutes?: Light  Can you get to places out of walking distance without help?  (For example, can you travel alone on buses or taxis, or drive your own car?): Yes  Can you go shopping for groceries or clothes without someone's help?: Yes  Can you prepare your own meals?: Yes  Can you do your own housework without help?: Yes  Because of any health problems, do you need the help of another person with your personal care needs such as eating, bathing, dressing, or getting around the house?: No  Can you handle your own money without help?: Yes  During the past four weeks, how would you rate your health in general?: Very good  How have things been going for you during the past four weeks?: Pretty well  Are you having difficulties driving your car?: No  Do you always fasten your seat belt when you are in a car?: Yes, usually  How often in the past four weeks have you been bothered by falling or dizzy when standing up?: Never  How often in the past four weeks have you been bothered by sexual problems?: Never  How often in the past four weeks have you been bothered by trouble eating well?: Never  How often in the past four weeks have you been bothered by teeth or denture problems?: Never  How often in the past four weeks have you been bothered with problems using the telephone?: Never  How often in the past four weeks have you been bothered by tiredness or fatigue?: Never  Have you fallen two  "or more times in the past year?: No  Are you afraid of falling?: No  Are you a smoker?: No  During the past four weeks, how many drinks of wine, beer, or other alcoholic beverages did you have?: One drink or less per week  Do you exercise for about 20 minutes three or more days a week?: Yes, some of the time  Have you been given any information to help you with hazards in your house that might hurt you?: Yes  Have you been given any information to help you with keeping track of your medications?: Yes  How often do you have trouble taking medicines the way you've been told to take them?: I always take them as prescribed  How confident are you that you can control and manage most of your health problems?: Very confident  What is your race? (Check all that apply.):     Objective:     BP (!) 158/86 (BP Location: Left arm)   Pulse 60   Temp 97.9 °F (36.6 °C)   Resp 17   Ht 5' 9" (1.753 m)   Wt 73.8 kg (162 lb 12.8 oz)   SpO2 99%   BMI 24.04 kg/m²     Physical Exam  Vitals and nursing note reviewed.   Constitutional:       General: He is not in acute distress.  HENT:      Head: Normocephalic.      Nose: No rhinorrhea.   Eyes:      Conjunctiva/sclera: Conjunctivae normal.      Pupils: Pupils are equal, round, and reactive to light.   Cardiovascular:      Rate and Rhythm: Normal rate and regular rhythm.   Pulmonary:      Effort: Pulmonary effort is normal.      Breath sounds: Normal breath sounds.   Abdominal:      Palpations: Abdomen is soft.      Tenderness: There is no abdominal tenderness.   Musculoskeletal:         General: No deformity or signs of injury.   Skin:     General: Skin is warm and dry.   Neurological:      General: No focal deficit present.      Mental Status: He is alert. Mental status is at baseline.   Psychiatric:         Mood and Affect: Mood normal.         Behavior: Behavior normal.              No data to display                  10/31/2023     9:20 AM 9/27/2023     1:00 PM 5/17/2022 "     1:30 PM   Fall Risk Assessment - Outpatient   Mobility Status Ambulatory Ambulatory Ambulatory   Number of falls 0 0 0   Identified as fall risk False False            Depression Screening  Over the past two weeks, has the patient felt down, depressed, or hopeless?: No  Over the past two weeks, has the patient felt little interest or pleasure in doing things?: No  Assessment/Plan:     1. Medicare annual wellness visit, subsequent  Assessment & Plan:  Reviewed recent wellness labs. See below for health maintenance.    Vaccinations:   - Flu: received 9/5/23   - Pneumonia: PCV13 2020, PPSV23 2021   - Shingles (>50): pt reports receiving x 2 2018   - Tdap: pt reports receiving 2020 for trip to [place]   - COVID: received original series  Screening:   - Prostate (>45):    - Lung Ca. Screening (50-80 with >20 pack yrs current or quit <15 yrs):    - Colon Ca. Screening (>45): pt thinks last c-scope was at age 72 and was told no further needed, pt says no polyps   - AAA (65-75 if ever smoked): n/a      2. Nocturnal leg cramps  Assessment & Plan:  Start trial of gabapentin 100 mg before bed, OK to try 200 mg if not effective    Orders:  -     gabapentin (NEURONTIN) 100 MG capsule; Take 1 capsule (100 mg total) by mouth every evening.  Dispense: 30 capsule; Refill: 11    3. Elevated blood pressure reading without diagnosis of hypertension  Comments:  suspect sec to taking sleep aid last night, will drop off or send BP logs in 2 weeks       Medicare Annual Wellness and Personalized Prevention Plan:   Fall Risk + Home Safety + Hearing Impairment + Depression Screen + Opioid and Substance Abuse Screening + Cognitive Impairment Screen + Health Risk Assessment all reviewed.     Health Maintenance Topics with due status: Not Due       Topic Last Completion Date    Lipid Panel 10/11/2023      The patient's Health Maintenance was reviewed and the following appears to be due at this time:   Health Maintenance Due   Topic  Date Due    TETANUS VACCINE  Never done    Shingles Vaccine (1 of 2) Never done    RSV Vaccine (Age 60+) (1 - 1-dose 60+ series) Never done    COVID-19 Vaccine (3 - 2023-24 season) 09/01/2023       Advance Care Planning   I attest to discussing Advance Care Planning with patient and/or family member.  Education was provided including the importance of the Health Care Power of , Advance Directives, and/or [place] documentation.  The patient expressed understanding to the importance of this information and discussion.       Follow up in about 1 year (around 10/31/2024) for Wellness. In addition to their scheduled follow up, the patient has also been instructed to follow up on as needed basis.

## 2023-11-06 ENCOUNTER — TELEPHONE (OUTPATIENT)
Dept: PRIMARY CARE CLINIC | Facility: CLINIC | Age: 83
End: 2023-11-06
Payer: MEDICARE

## 2023-11-06 DIAGNOSIS — G47.62 NOCTURNAL LEG CRAMPS: Primary | ICD-10-CM

## 2023-11-06 DIAGNOSIS — G47.00 INSOMNIA, UNSPECIFIED TYPE: ICD-10-CM

## 2023-11-06 NOTE — TELEPHONE ENCOUNTER
----- Message from Carmine Holguin sent at 11/6/2023  8:30 AM CST -----  .Type:  Needs Medical Advice    Who Called: pt  Symptoms (please be specific):    How long has patient had these symptoms:    Pharmacy name and phone #:    Would the patient rather a call back or a response via [facility]? Call back  Best Call Back Number: [phone]  Additional Information: pt called to state medication not working and that it has exasperated the issue pt stated he stopped taking medication gabapentin (NEURONTIN) 100 MG capsule, and is wanting to discuss alternative mediation

## 2023-11-10 RX ORDER — DILTIAZEM HYDROCHLORIDE 30 MG/1
30 TABLET, FILM COATED ORAL NIGHTLY
Qty: 30 TABLET | Refills: 11 | Status: SHIPPED | OUTPATIENT
Start: 2023-11-10 | End: 2024-11-09

## 2023-11-10 RX ORDER — ZOLPIDEM TARTRATE 10 MG/1
10 TABLET ORAL NIGHTLY PRN
Qty: 30 TABLET | Refills: 2 | Status: SHIPPED | OUTPATIENT
Start: 2023-11-10

## 2023-11-10 NOTE — TELEPHONE ENCOUNTER
If gabapentin caused side effects or worsening symptoms, then we'll discontinue that one.     The next two options include:  Vitamin B complex (many OTC options available) and vitamin E 800 units before bed. Some studies have shown this to be effective.  Diltiazem which is a prescription medication for blood pressure and heart issues. Used at low doses, it can help leg cramps.     Let me know which option the pt prefers.    Thanks  Murphy Canales MD

## 2023-11-10 NOTE — TELEPHONE ENCOUNTER
Start trial of diltiazem 30 mg before bed for nocturnal leg cramps.    Pt has been taking Ambien 10 mg nightly PRN for insomnia for years. He acquired this while in [place]. Requesting refill. Known to tolerate. Sending Rx today.    Murphy Canales MD

## 2023-12-08 ENCOUNTER — TELEPHONE (OUTPATIENT)
Dept: PRIMARY CARE CLINIC | Facility: CLINIC | Age: 83
End: 2023-12-08
Payer: MEDICARE

## 2023-12-08 ENCOUNTER — PATIENT MESSAGE (OUTPATIENT)
Dept: PRIMARY CARE CLINIC | Facility: CLINIC | Age: 83
End: 2023-12-08
Payer: MEDICARE

## 2023-12-08 VITALS — SYSTOLIC BLOOD PRESSURE: 127 MMHG | DIASTOLIC BLOOD PRESSURE: 72 MMHG

## 2023-12-08 NOTE — TELEPHONE ENCOUNTER
Reviewed home BP log.     SBP rare upper 140s to low 150s (<15% of readings)  SBP typically 120s-130s    DBP rarely above 85    Estimated average 130-135/80-82    Murphy Canales MD

## 2024-01-26 ENCOUNTER — TELEPHONE (OUTPATIENT)
Dept: PRIMARY CARE CLINIC | Facility: CLINIC | Age: 84
End: 2024-01-26
Payer: MEDICARE

## 2024-01-26 NOTE — TELEPHONE ENCOUNTER
Please call patient to advise of clearance appt.   Suad weems when I called.   2/28/2024 @ [facility]

## 2024-02-05 PROBLEM — Z00.00 MEDICARE ANNUAL WELLNESS VISIT, SUBSEQUENT: Status: RESOLVED | Noted: 2023-10-31 | Resolved: 2024-02-05

## 2024-02-28 ENCOUNTER — OFFICE VISIT (OUTPATIENT)
Dept: PRIMARY CARE CLINIC | Facility: CLINIC | Age: 84
End: 2024-02-28
Payer: MEDICARE

## 2024-02-28 DIAGNOSIS — Z01.818 PRE-OP EXAMINATION: Primary | ICD-10-CM

## 2024-02-28 PROCEDURE — 99213 OFFICE O/P EST LOW 20 MIN: CPT | Mod: ,,, | Performed by: STUDENT IN AN ORGANIZED HEALTH CARE EDUCATION/TRAINING PROGRAM

## 2024-02-28 NOTE — ASSESSMENT & PLAN NOTE
Health status is excellent.   No change in health since Wellness Visit 10/2023. Labs at that time were good.   Capable of >5 METS  RCRI score is 0    Blood pressure is elevated today. Some element of white coat HTN as his home BP logs are typically at goal. BP reading from home today is 140/71. We will monitor moving forward, but low level of concern regarding BP at this time.     Overall, this patient is low risk for a low risk procedure such as cataracts surgery. From PCP perspective, cleared to proceed as scheduled.

## 2024-02-28 NOTE — PROGRESS NOTES
Subjective:       Patient ID: Aleisha Hernandez is a 83 y.o. male.    No past medical history on file.     Chief Complaint: Surgical Clearance    Presents for pre-op clearance. Scheduled to undergo cataract surgery on March 5th, left eye first then will have right eye done about 2 weeks later.     Feels well. No complaints. Remains very active. No change to overall health since last visit.      Review of Systems   Constitutional:  Negative for chills and fever.   HENT:  Negative for sinus pressure/congestion and sore throat.    Respiratory:  Negative for cough, shortness of breath and wheezing.    Cardiovascular:  Negative for chest pain and leg swelling.   Gastrointestinal:  Negative for abdominal pain, nausea and vomiting.   Genitourinary:  Negative for dysuria and hematuria.   Musculoskeletal:  Negative for arthralgias and myalgias.   Integumentary:  Negative for rash.   Neurological:  Negative for dizziness and syncope.   Hematological:  Negative for adenopathy.   Psychiatric/Behavioral:  Negative for confusion. The patient is not nervous/anxious.            Objective:      Physical Exam  Vitals and nursing note reviewed.   Constitutional:       General: He is not in acute distress.  HENT:      Head: Normocephalic.      Nose: No rhinorrhea.   Eyes:      Conjunctiva/sclera: Conjunctivae normal.      Pupils: Pupils are equal, round, and reactive to light.   Cardiovascular:      Rate and Rhythm: Normal rate and regular rhythm.   Pulmonary:      Effort: Pulmonary effort is normal.      Breath sounds: Normal breath sounds.   Abdominal:      Palpations: Abdomen is soft.      Tenderness: There is no abdominal tenderness.   Musculoskeletal:         General: No deformity or signs of injury.   Skin:     General: Skin is warm and dry.   Neurological:      General: No focal deficit present.      Mental Status: He is alert. Mental status is at baseline.   Psychiatric:         Mood and Affect: Mood normal.         Behavior:  Behavior normal.           Assessment & Plan:   1. Pre-op examination  Assessment & Plan:  Health status is excellent.   No change in health since Wellness Visit 10/2023. Labs at that time were good.   Capable of >5 METS  RCRI score is 0    Blood pressure is elevated today. Some element of white coat HTN as his home BP logs are typically at goal. BP reading from home today is 140/71. We will monitor moving forward, but low level of concern regarding BP at this time.     Overall, this patient is low risk for a low risk procedure such as cataracts surgery. From PCP perspective, cleared to proceed as scheduled.         Keep scheduled wellness visit. In addition to their scheduled follow up, the patient has also been instructed to follow up on as needed basis.

## 2024-02-29 ENCOUNTER — PATIENT MESSAGE (OUTPATIENT)
Dept: PRIMARY CARE CLINIC | Facility: CLINIC | Age: 84
End: 2024-02-29
Payer: MEDICARE

## 2024-02-29 VITALS
BODY MASS INDEX: 23.11 KG/M2 | TEMPERATURE: 98 F | HEART RATE: 99 BPM | SYSTOLIC BLOOD PRESSURE: 140 MMHG | DIASTOLIC BLOOD PRESSURE: 71 MMHG | RESPIRATION RATE: 18 BRPM | HEIGHT: 69 IN | OXYGEN SATURATION: 98 % | WEIGHT: 156 LBS

## 2024-03-10 ENCOUNTER — PATIENT MESSAGE (OUTPATIENT)
Dept: PRIMARY CARE CLINIC | Facility: CLINIC | Age: 84
End: 2024-03-10
Payer: MEDICARE

## 2024-03-11 VITALS — SYSTOLIC BLOOD PRESSURE: 111 MMHG | DIASTOLIC BLOOD PRESSURE: 73 MMHG

## 2024-05-10 ENCOUNTER — OFFICE VISIT (OUTPATIENT)
Dept: PRIMARY CARE CLINIC | Facility: CLINIC | Age: 84
End: 2024-05-10
Payer: MEDICARE

## 2024-05-10 VITALS
TEMPERATURE: 98 F | HEART RATE: 55 BPM | RESPIRATION RATE: 18 BRPM | BODY MASS INDEX: 23.25 KG/M2 | HEIGHT: 69 IN | WEIGHT: 157 LBS | OXYGEN SATURATION: 98 % | DIASTOLIC BLOOD PRESSURE: 84 MMHG | SYSTOLIC BLOOD PRESSURE: 149 MMHG

## 2024-05-10 DIAGNOSIS — M25.50 MULTIPLE JOINT PAIN: Primary | ICD-10-CM

## 2024-05-10 PROCEDURE — 99213 OFFICE O/P EST LOW 20 MIN: CPT | Mod: ,,, | Performed by: STUDENT IN AN ORGANIZED HEALTH CARE EDUCATION/TRAINING PROGRAM

## 2024-05-10 RX ORDER — PREDNISOLONE ACETATE 10 MG/ML
1 SUSPENSION/ DROPS OPHTHALMIC
COMMUNITY
Start: 2024-04-09

## 2024-05-10 RX ORDER — IBUPROFEN 600 MG/1
600 TABLET ORAL EVERY 8 HOURS PRN
Qty: 30 TABLET | Refills: 0 | Status: SHIPPED | OUTPATIENT
Start: 2024-05-10

## 2024-05-10 RX ORDER — METHYLPREDNISOLONE 4 MG/1
TABLET ORAL
Qty: 21 EACH | Refills: 0 | Status: SHIPPED | OUTPATIENT
Start: 2024-05-10 | End: 2024-05-31

## 2024-05-10 NOTE — PROGRESS NOTES
"Subjective:       Patient ID: Aleisha Hernandez is a 83 y.o. male.    -------------------------------------    Nocturnal leg cramps        Chief Complaint: Back Pain and Knee Pain    Had recent episode of severe back pain about 5 days ago. Sharp "shooting" pain from R side of side down R leg. RLE gave out, nearly fell (caught himself on fence). Used heating pad, muscle relaxers, OTC meds and got relief within a day or two.     Next day, developed knee pain. Severe with swelling. Improved with ice and ibuprofen. Returned to normal level of activity.     Next day developed R jaw pain, worse if he tried to open mouth wide. This has resolved.     This morning, woke up with R hand and wrist pain. Very stiff. Pain worse with movement. Improved with ice.         ROS (-) for neck pain. Cervical disease unlikely  Denies history of gout  No joint erythema  No recent travel        Review of Systems   Constitutional:  Negative for chills and fever.   HENT:  Negative for sinus pressure/congestion and sore throat.    Respiratory:  Negative for cough, shortness of breath and wheezing.    Cardiovascular:  Negative for chest pain and leg swelling.   Gastrointestinal:  Negative for abdominal pain, nausea and vomiting.   Genitourinary:  Negative for dysuria and hematuria.   Musculoskeletal:  Positive for arthralgias, back pain and leg pain. Negative for myalgias.   Integumentary:  Negative for rash.   Neurological:  Negative for dizziness and syncope.   Hematological:  Negative for adenopathy.   Psychiatric/Behavioral:  Negative for confusion. The patient is not nervous/anxious.            Objective:      Physical Exam  Vitals and nursing note reviewed.   Constitutional:       General: He is not in acute distress.  HENT:      Head: Normocephalic.      Nose: No rhinorrhea.   Eyes:      Conjunctiva/sclera: Conjunctivae normal.      Pupils: Pupils are equal, round, and reactive to light.   Cardiovascular:      Rate and Rhythm: Normal " rate and regular rhythm.   Pulmonary:      Effort: Pulmonary effort is normal.      Breath sounds: Normal breath sounds.   Abdominal:      Palpations: Abdomen is soft.      Tenderness: There is no abdominal tenderness.   Musculoskeletal:         General: No deformity or signs of injury.   Skin:     General: Skin is warm and dry.   Neurological:      General: No focal deficit present.      Mental Status: He is alert. Mental status is at baseline.   Psychiatric:         Mood and Affect: Mood normal.         Behavior: Behavior normal.           Assessment & Plan:   1. Multiple joint pain  Assessment & Plan:  Etiology unclear. At this time presume flare up of OA. Rx Medrol Dosepack. If symptoms not improving in 2-3 days consider further workup to include inflammatory markers, possibly imaging of lumbar spine.    Orders:  -     methylPREDNISolone (MEDROL DOSEPACK) 4 mg tablet; use as directed  Dispense: 21 each; Refill: 0  -     ibuprofen (ADVIL,MOTRIN) 600 MG tablet; Take 1 tablet (600 mg total) by mouth every 8 (eight) hours as needed for Pain.  Dispense: 30 tablet; Refill: 0        Keep scheduled f/u. In addition to their scheduled follow up, the patient has also been instructed to follow up on as needed basis.

## 2024-05-12 NOTE — ASSESSMENT & PLAN NOTE
Etiology unclear. At this time presume flare up of OA. Rx Medrol Dosepack. If symptoms not improving in 2-3 days consider further workup to include inflammatory markers, possibly imaging of lumbar spine.

## 2024-06-17 ENCOUNTER — OFFICE VISIT (OUTPATIENT)
Dept: PRIMARY CARE CLINIC | Facility: CLINIC | Age: 84
End: 2024-06-17
Payer: MEDICARE

## 2024-06-17 VITALS
RESPIRATION RATE: 18 BRPM | WEIGHT: 156 LBS | DIASTOLIC BLOOD PRESSURE: 67 MMHG | OXYGEN SATURATION: 98 % | TEMPERATURE: 98 F | BODY MASS INDEX: 23.11 KG/M2 | SYSTOLIC BLOOD PRESSURE: 124 MMHG | HEART RATE: 63 BPM | HEIGHT: 69 IN

## 2024-06-17 DIAGNOSIS — D69.1 ABNORMAL PLATELETS: ICD-10-CM

## 2024-06-17 DIAGNOSIS — D64.9 MILD ANEMIA: ICD-10-CM

## 2024-06-17 DIAGNOSIS — Z01.818 PRE-OP EXAMINATION: Primary | ICD-10-CM

## 2024-06-17 DIAGNOSIS — Z01.818 PRE-OP EVALUATION: ICD-10-CM

## 2024-06-17 PROCEDURE — 99213 OFFICE O/P EST LOW 20 MIN: CPT | Mod: ,,, | Performed by: STUDENT IN AN ORGANIZED HEALTH CARE EDUCATION/TRAINING PROGRAM

## 2024-06-17 NOTE — PROGRESS NOTES
Subjective:       Patient ID: Aleisha Hernandez is a 83 y.o. male.    -------------------------------------    Nocturnal leg cramps        Chief Complaint: Follow-up (Surgical Clearance )    Presents for pre-op clearance for eyelid surgery (B/L upper blepharotomy, ptosis repair). He has no acute complaints. Overall health remains excellent.      Review of Systems   Constitutional:  Negative for chills and fever.   HENT:  Negative for sinus pressure/congestion and sore throat.    Respiratory:  Negative for cough, shortness of breath and wheezing.    Cardiovascular:  Negative for chest pain and leg swelling.   Gastrointestinal:  Negative for abdominal pain, nausea and vomiting.   Genitourinary:  Negative for dysuria and hematuria.   Musculoskeletal:  Negative for arthralgias and myalgias.   Integumentary:  Negative for rash.   Neurological:  Negative for dizziness and syncope.   Hematological:  Negative for adenopathy.   Psychiatric/Behavioral:  Negative for confusion. The patient is not nervous/anxious.            Objective:      Physical Exam  Vitals and nursing note reviewed.   Constitutional:       General: He is not in acute distress.  HENT:      Head: Normocephalic.      Nose: No rhinorrhea.   Eyes:      Conjunctiva/sclera: Conjunctivae normal.      Pupils: Pupils are equal, round, and reactive to light.   Cardiovascular:      Rate and Rhythm: Normal rate and regular rhythm.   Pulmonary:      Effort: Pulmonary effort is normal.      Breath sounds: Normal breath sounds.   Abdominal:      Palpations: Abdomen is soft.      Tenderness: There is no abdominal tenderness.   Musculoskeletal:         General: No deformity or signs of injury.   Skin:     General: Skin is warm and dry.   Neurological:      General: No focal deficit present.      Mental Status: He is alert. Mental status is at baseline.   Psychiatric:         Mood and Affect: Mood normal.         Behavior: Behavior normal.           Assessment & Plan:   1.  Pre-op examination  Assessment & Plan:  Health status is excellent.   Reviewed CBC, CMP, PT, PTT all WNL  EKG acceptable (NSR, LAFB)  Capable of >5 METS  RCRI score is 0    Overall, this patient is low risk for a low risk procedure such as blepharotomy and ptosis repair. From PCP perspective, cleared to proceed as scheduled.     Orders:  -     CBC Auto Differential; Future; Expected date: 06/17/2024  -     Comprehensive Metabolic Panel; Future; Expected date: 06/17/2024  -     APTT; Future; Expected date: 06/17/2024  -     Protime-INR; Future; Expected date: 06/17/2024  -     POCT EKG 12-LEAD (Manually Resulted by Ordering Provider)    2. Pre-op evaluation  -     CBC Auto Differential; Future; Expected date: 06/17/2024  -     Comprehensive Metabolic Panel; Future; Expected date: 06/17/2024  -     APTT; Future; Expected date: 06/17/2024  -     Protime-INR; Future; Expected date: 06/17/2024  -     POCT EKG 12-LEAD (Manually Resulted by Ordering Provider)    3. Mild anemia  -     CBC Auto Differential; Future; Expected date: 06/17/2024  -     APTT; Future; Expected date: 06/17/2024  -     Protime-INR; Future; Expected date: 06/17/2024    4. Abnormal platelets  -     APTT; Future; Expected date: 06/17/2024  -     Protime-INR; Future; Expected date: 06/17/2024        Keep scheduled f/u. In addition to their scheduled follow up, the patient has also been instructed to follow up on as needed basis.

## 2024-06-18 ENCOUNTER — LAB VISIT (OUTPATIENT)
Dept: LAB | Facility: HOSPITAL | Age: 84
End: 2024-06-18
Attending: STUDENT IN AN ORGANIZED HEALTH CARE EDUCATION/TRAINING PROGRAM
Payer: MEDICARE

## 2024-06-18 DIAGNOSIS — D64.9 MILD ANEMIA: ICD-10-CM

## 2024-06-18 DIAGNOSIS — D69.1 ABNORMAL PLATELETS: ICD-10-CM

## 2024-06-18 DIAGNOSIS — Z01.818 PRE-OP EVALUATION: ICD-10-CM

## 2024-06-18 LAB
ALBUMIN SERPL-MCNC: 3.9 G/DL (ref 3.4–4.8)
ALBUMIN/GLOB SERPL: 1.6 RATIO (ref 1.1–2)
ALP SERPL-CCNC: 61 UNIT/L (ref 40–150)
ALT SERPL-CCNC: 15 UNIT/L (ref 0–55)
ANION GAP SERPL CALC-SCNC: 5 MEQ/L
APTT PPP: 30 SECONDS (ref 23.2–33.7)
AST SERPL-CCNC: 15 UNIT/L (ref 5–34)
BASOPHILS # BLD AUTO: 0.05 X10(3)/MCL
BASOPHILS NFR BLD AUTO: 0.5 %
BILIRUB SERPL-MCNC: 0.7 MG/DL
BUN SERPL-MCNC: 17.5 MG/DL (ref 8.4–25.7)
CALCIUM SERPL-MCNC: 9.2 MG/DL (ref 8.8–10)
CHLORIDE SERPL-SCNC: 107 MMOL/L (ref 98–107)
CO2 SERPL-SCNC: 28 MMOL/L (ref 23–31)
CREAT SERPL-MCNC: 0.98 MG/DL (ref 0.73–1.18)
CREAT/UREA NIT SERPL: 18
EOSINOPHIL # BLD AUTO: 0.09 X10(3)/MCL (ref 0–0.9)
EOSINOPHIL NFR BLD AUTO: 0.9 %
ERYTHROCYTE [DISTWIDTH] IN BLOOD BY AUTOMATED COUNT: 15 % (ref 11.5–17)
GFR SERPLBLD CREATININE-BSD FMLA CKD-EPI: >60 ML/MIN/1.73/M2
GLOBULIN SER-MCNC: 2.5 GM/DL (ref 2.4–3.5)
GLUCOSE SERPL-MCNC: 102 MG/DL (ref 82–115)
HCT VFR BLD AUTO: 44.1 % (ref 42–52)
HGB BLD-MCNC: 14.1 G/DL (ref 14–18)
IMM GRANULOCYTES # BLD AUTO: 0.02 X10(3)/MCL (ref 0–0.04)
IMM GRANULOCYTES NFR BLD AUTO: 0.2 %
INR PPP: 1.1
LYMPHOCYTES # BLD AUTO: 2.23 X10(3)/MCL (ref 0.6–4.6)
LYMPHOCYTES NFR BLD AUTO: 22.9 %
MCH RBC QN AUTO: 29.7 PG (ref 27–31)
MCHC RBC AUTO-ENTMCNC: 32 G/DL (ref 33–36)
MCV RBC AUTO: 92.8 FL (ref 80–94)
MONOCYTES # BLD AUTO: 0.81 X10(3)/MCL (ref 0.1–1.3)
MONOCYTES NFR BLD AUTO: 8.3 %
NEUTROPHILS # BLD AUTO: 6.53 X10(3)/MCL (ref 2.1–9.2)
NEUTROPHILS NFR BLD AUTO: 67.2 %
NRBC BLD AUTO-RTO: 0 %
PLATELET # BLD AUTO: 302 X10(3)/MCL (ref 130–400)
PMV BLD AUTO: 12.9 FL (ref 7.4–10.4)
POTASSIUM SERPL-SCNC: 4.8 MMOL/L (ref 3.5–5.1)
PROT SERPL-MCNC: 6.4 GM/DL (ref 5.8–7.6)
PROTHROMBIN TIME: 14.1 SECONDS (ref 12.5–14.5)
RBC # BLD AUTO: 4.75 X10(6)/MCL (ref 4.7–6.1)
SODIUM SERPL-SCNC: 140 MMOL/L (ref 136–145)
WBC # BLD AUTO: 9.73 X10(3)/MCL (ref 4.5–11.5)

## 2024-06-18 PROCEDURE — 85025 COMPLETE CBC W/AUTO DIFF WBC: CPT

## 2024-06-18 PROCEDURE — 85610 PROTHROMBIN TIME: CPT

## 2024-06-18 PROCEDURE — 36415 COLL VENOUS BLD VENIPUNCTURE: CPT

## 2024-06-18 PROCEDURE — 85730 THROMBOPLASTIN TIME PARTIAL: CPT

## 2024-06-18 PROCEDURE — 80053 COMPREHEN METABOLIC PANEL: CPT

## 2024-06-20 ENCOUNTER — TELEPHONE (OUTPATIENT)
Dept: PRIMARY CARE CLINIC | Facility: CLINIC | Age: 84
End: 2024-06-20
Payer: MEDICARE

## 2024-06-20 NOTE — ASSESSMENT & PLAN NOTE
Health status is excellent.   Reviewed CBC, CMP, PT, PTT all WNL  EKG acceptable (NSR, LAFB)  Capable of >5 METS  RCRI score is 0    Overall, this patient is low risk for a low risk procedure such as blepharotomy and ptosis repair. From PCP perspective, cleared to proceed as scheduled.

## 2024-06-24 ENCOUNTER — PATIENT MESSAGE (OUTPATIENT)
Dept: PRIMARY CARE CLINIC | Facility: CLINIC | Age: 84
End: 2024-06-24
Payer: MEDICARE

## 2024-07-10 ENCOUNTER — TELEPHONE (OUTPATIENT)
Dept: PRIMARY CARE CLINIC | Facility: CLINIC | Age: 84
End: 2024-07-10
Payer: MEDICARE

## 2024-07-23 ENCOUNTER — HOSPITAL ENCOUNTER (OUTPATIENT)
Dept: RADIOLOGY | Facility: HOSPITAL | Age: 84
Discharge: HOME | End: 2024-07-23
Attending: OTOLARYNGOLOGY
Payer: MEDICARE

## 2024-07-23 DIAGNOSIS — Z01.818 OTHER SPECIFIED PRE-OPERATIVE EXAMINATION: ICD-10-CM

## 2024-07-23 DIAGNOSIS — Z01.818 OTHER SPECIFIED PRE-OPERATIVE EXAMINATION: Primary | ICD-10-CM

## 2024-07-23 PROCEDURE — 71046 X-RAY EXAM CHEST 2 VIEWS: CPT | Mod: TC

## 2024-08-14 ENCOUNTER — OFFICE VISIT (OUTPATIENT)
Dept: PRIMARY CARE CLINIC | Facility: CLINIC | Age: 84
End: 2024-08-14
Payer: MEDICARE

## 2024-08-14 VITALS
SYSTOLIC BLOOD PRESSURE: 127 MMHG | DIASTOLIC BLOOD PRESSURE: 73 MMHG | HEIGHT: 69 IN | TEMPERATURE: 98 F | HEART RATE: 84 BPM | WEIGHT: 151 LBS | BODY MASS INDEX: 22.36 KG/M2 | OXYGEN SATURATION: 98 % | RESPIRATION RATE: 18 BRPM

## 2024-08-14 DIAGNOSIS — S41.112A SKIN TEAR OF LEFT UPPER EXTREMITY: Primary | ICD-10-CM
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
## 2024-08-14 PROCEDURE — 90714 TD VACC NO PRESV 7 YRS+ IM: CPT | Mod: ,,, | Performed by: STUDENT IN AN ORGANIZED HEALTH CARE EDUCATION/TRAINING PROGRAM

## 2024-08-14 PROCEDURE — 99214 OFFICE O/P EST MOD 30 MIN: CPT | Mod: 25,,, | Performed by: STUDENT IN AN ORGANIZED HEALTH CARE EDUCATION/TRAINING PROGRAM

## 2024-08-14 PROCEDURE — 90471 IMMUNIZATION ADMIN: CPT | Mod: ,,, | Performed by: STUDENT IN AN ORGANIZED HEALTH CARE EDUCATION/TRAINING PROGRAM

## 2024-08-14 RX ORDER — ERYTHROMYCIN 5 MG/G
OINTMENT OPHTHALMIC
COMMUNITY
Start: 2024-08-12

## 2024-08-14 RX ORDER — CEPHALEXIN 500 MG/1
500 TABLET ORAL 2 TIMES DAILY
COMMUNITY
Start: 2024-07-22

## 2024-08-14 RX ORDER — ONDANSETRON 8 MG/1
8 TABLET, ORALLY DISINTEGRATING ORAL
COMMUNITY
Start: 2024-07-22 | End: 2024-08-14

## 2024-08-14 RX ORDER — TRAMADOL HYDROCHLORIDE 100 MG/1
100 TABLET, COATED ORAL EVERY 8 HOURS PRN
Qty: 21 TABLET | Refills: 0 | Status: SHIPPED | OUTPATIENT
Start: 2024-08-14

## 2024-08-14 RX ORDER — TRAMADOL HYDROCHLORIDE 50 MG/1
50 TABLET ORAL
COMMUNITY
Start: 2024-07-23 | End: 2024-08-14 | Stop reason: SDUPTHER

## 2024-08-14 RX ORDER — IBUPROFEN 800 MG/1
800 TABLET ORAL 3 TIMES DAILY
COMMUNITY
Start: 2024-06-04 | End: 2024-08-14

## 2024-08-14 RX ORDER — ACYCLOVIR 400 MG/1
400 TABLET ORAL
COMMUNITY
Start: 2024-07-22

## 2024-08-14 NOTE — ASSESSMENT & PLAN NOTE
Large areas of L forearm. I think he will need at least an evaluation and a few dressing changes at [facility] to facilitate appropriate healing and prevent infection. Referral to [facility] sent. Appreciate their assistance with this patient.     Pain is severe. He had a tramadol 50 mg from previous surgery that did not provide much relief. Sending Rx for Tramadol 100 mg. OK to add Tylenol 1000 mg up to TID    Non-adherent dressing and Coban applied today    Given extent of injury, Td vaccine given today (has been > 10 years)

## 2024-08-14 NOTE — PROGRESS NOTES
"Subjective:       Patient ID: Aleisha Hernandez is a 84 y.o. male.    -------------------------------------    BPH (benign prostatic hyperplasia)    Nocturnal leg cramps        Chief Complaint: Fall    Present for evaluation after fall. R knee "gave out". He had been having some issues with knee earlier that day. Typically has more L knee issues. Follows [facility] (Ortho) Dr. Roberto Jerome. As he fell, L forearm went between mattress and footboard. Suffered large skin tear/abrasion. C/o severe pain in L forearm. Some pain in R buttock where he landed during fall.    Fall was solely because of R knee. Denies dizziness, lightheadedness, vision changes, chest pain, etc.  Denies head trauma, abdominal trauma. ROS (+) for R buttock/hip pain.      Review of Systems   Constitutional:  Negative for chills and fever.   HENT:  Negative for sinus pressure/congestion and sore throat.    Respiratory:  Negative for cough, shortness of breath and wheezing.    Cardiovascular:  Negative for chest pain and leg swelling.   Gastrointestinal:  Negative for abdominal pain, nausea and vomiting.   Genitourinary:  Negative for dysuria and hematuria.   Musculoskeletal:  Positive for arthralgias. Negative for myalgias.   Integumentary:  Positive for wound (see HPI).   Neurological:  Negative for dizziness and syncope.   Hematological:  Negative for adenopathy.   Psychiatric/Behavioral:  Negative for confusion. The patient is not nervous/anxious.            Objective:      Physical Exam  Vitals and nursing note reviewed.   Constitutional:       General: He is not in acute distress.  HENT:      Head: Normocephalic.      Nose: No rhinorrhea.   Eyes:      Conjunctiva/sclera: Conjunctivae normal.      Pupils: Pupils are equal, round, and reactive to light.   Cardiovascular:      Rate and Rhythm: Normal rate and regular rhythm.   Pulmonary:      Effort: Pulmonary effort is normal.      Breath sounds: Normal breath sounds.   Abdominal:      Palpations: " Abdomen is soft.      Tenderness: There is no abdominal tenderness.   Musculoskeletal:         General: No deformity or signs of injury.   Skin:     General: Skin is warm and dry.      Comments: Multiple large avulsion type skin tears of L forearm medial and lateral. No lacerations or areas amendable to suturing. No significant surrounding erythema. Area is extremely tender to touch.   Small abrasion to R olecranon process.    Neurological:      General: No focal deficit present.      Mental Status: He is alert. Mental status is at baseline.   Psychiatric:         Mood and Affect: Mood normal.         Behavior: Behavior normal.           Assessment & Plan:   1. Skin tear of left upper extremity  Assessment & Plan:  Large areas of L forearm. I think he will need at least an evaluation and a few dressing changes at Wound Care to facilitate appropriate healing and prevent infection. Referral to Wound Care sent. Appreciate their assistance with this patient.     Pain is severe. He had a tramadol 50 mg from previous surgery that did not provide much relief. Sending Rx for Tramadol 100 mg. OK to add Tylenol 1000 mg up to TID    Non-adherent dressing and Coban applied today    Given extent of injury, Td vaccine given today (has been > 10 years)    Orders:  -     traMADoL 100 mg Tab; Take 100 mg by mouth every 8 (eight) hours as needed for Pain.  Dispense: 21 tablet; Refill: 0  -     Discontinue: Tdap (BOOSTRIX) vaccine injection 0.5 mL  -     Ambulatory referral/consult to Wound Clinic  -     Td (Tenivac) IM vaccine (>/= 8 yo)    2. Fall, initial encounter  Comments:  c/o R buttock/hip pain although mild. Sending for XR R hip  Orders:  -     X-Ray Hip 2 or 3 views Right with Pelvis when performed; Future; Expected date: 08/14/2024  -     traMADoL 100 mg Tab; Take 100 mg by mouth every 8 (eight) hours as needed for Pain.  Dispense: 21 tablet; Refill: 0  -     Ambulatory referral/consult to Wound Clinic  -     Td (Teniva)  IM vaccine (>/= 6 yo)        Keep scheduled f/u. In addition to their scheduled follow up, the patient has also been instructed to follow up on as needed basis.     I spent a total of 35 minutes on the day of the visit.This includes face to face time and non-face to face time preparing to see the patient (eg, review of tests), obtaining and/or reviewing separately obtained history, documenting clinical information in the electronic or other health record, independently interpreting results and communicating results to the patient/family/caregiver, or care coordinator.

## 2024-08-16 ENCOUNTER — HOSPITAL ENCOUNTER (OUTPATIENT)
Dept: WOUND CARE | Facility: HOSPITAL | Age: 84
Discharge: HOME | End: 2024-08-16
Attending: STUDENT IN AN ORGANIZED HEALTH CARE EDUCATION/TRAINING PROGRAM
Payer: MEDICARE

## 2024-08-16 VITALS
BODY MASS INDEX: 22.81 KG/M2 | RESPIRATION RATE: 16 BRPM | DIASTOLIC BLOOD PRESSURE: 76 MMHG | TEMPERATURE: 98 F | HEIGHT: 69 IN | SYSTOLIC BLOOD PRESSURE: 137 MMHG | WEIGHT: 154 LBS | HEART RATE: 59 BPM

## 2024-08-16 DIAGNOSIS — I10 ESSENTIAL HYPERTENSION, BENIGN: ICD-10-CM

## 2024-08-16 DIAGNOSIS — N40.0 BENIGN PROSTATIC HYPERPLASIA WITHOUT LOWER URINARY TRACT SYMPTOMS: ICD-10-CM

## 2024-08-16 DIAGNOSIS — S41.112A SKIN TEAR OF LEFT UPPER EXTREMITY: Primary | ICD-10-CM

## 2024-08-16 DIAGNOSIS — M17.9 OSTEOARTHRITIS OF KNEE, UNSPECIFIED LATERALITY, UNSPECIFIED OSTEOARTHRITIS TYPE: ICD-10-CM

## 2024-08-16 PROCEDURE — 97597 DBRDMT OPN WND 1ST 20 CM/<: CPT

## 2024-08-16 PROCEDURE — 27000999 HC MEDICAL RECORD PHOTO DOCUMENTATION

## 2024-08-16 PROCEDURE — 99203 OFFICE O/P NEW LOW 30 MIN: CPT | Mod: 25,,, | Performed by: EMERGENCY MEDICINE

## 2024-08-16 PROCEDURE — 97598 DBRDMT OPN WND ADDL 20CM/<: CPT | Mod: ,,, | Performed by: EMERGENCY MEDICINE

## 2024-08-16 PROCEDURE — 97598 DBRDMT OPN WND ADDL 20CM/<: CPT

## 2024-08-16 PROCEDURE — 97597 DBRDMT OPN WND 1ST 20 CM/<: CPT | Mod: ,,, | Performed by: EMERGENCY MEDICINE

## 2024-08-16 PROCEDURE — 99203 OFFICE O/P NEW LOW 30 MIN: CPT

## 2024-08-16 RX ORDER — MUPIROCIN 20 MG/G
OINTMENT TOPICAL DAILY
Qty: 30 G | Refills: 0 | Status: SHIPPED | OUTPATIENT
Start: 2024-08-16 | End: 2024-08-26

## 2024-08-16 NOTE — PROCEDURES
Debridement    Date/Time: 8/16/2024 8:00 AM    Performed by: Mellisa Campbell MD  Authorized by: Mellisa Campbell MD  Associated wounds:        Wound 08/16/24 0828 Traumatic Left posterior;lower Arm #1       Wound 08/16/24 0830 Traumatic Left anterior;inferior;lower Arm #2  Consent Done?:  Yes (Written)  Local anesthesia used?: Yes    Local anesthetic:  Topical anesthetic    Wound Details:    Location:  Left arm    Type of Debridement:  Excisional       Length (cm):  16       Area (sq cm):  80       Width (cm):  5       Percent Debrided (%):  50       Depth (cm):  0.1       Total Area Debrided (sq cm):  40    Depth of debridement:  Epidermis/Dermis    Tissue debrided:  Dermis and Epidermis    Devitalized tissue debrided:  Necrotic/Eschar and Other    Instruments:  Scissors and Forceps  Bleeding:  None    2nd Wound Details:     Location:  Left arm    Type of Debridement:  Excisional       Length (cm):  9.5       Area (sq cm):  33.25       Width (cm):  3.5       Percent Debrided (%):  50       Depth (cm):  0.1       Total Area Debrided (sq cm):  16.63    Depth of debridement:  Epidermis/Dermis    Devitalized tissue debrided:  Necrotic/Eschar    Instruments:  Scissors and Forceps  Bleeding:  None  Patient tolerance:  Patient tolerated the procedure well with no immediate complications

## 2024-08-16 NOTE — PATIENT INSTRUCTIONS
Pt seen today by: Dr. Mellisa Campbell    Supplies ordered on 8/16/2024 from [facility]    DRESSING INSTRUCTIONS:    Wound location: Left Posterior and Left Anterior Forearm    Dressings to be changed daily and as needed if soiled or not intact.  Patient and/or family may be asked to assist on other days.      Cleanse wound with wound cleanser or baby shampoo  Apply mupirocin ointment  Apply adaptic to the wound bed  Cover with gauze then wrap with kerlix and secure with tape       Compression with: N/A    Return visit: Friday August 23, 2024, at 11:00 am.       Wound may have been debrided in clinic: if so, WHAT YOU NEED TO KNOW:    Debridement is the removal of infected, damaged, or dead tissue so a wound can heal properly. Your wound may need more than one debridement. Debridement can cause bleeding, and a small amount of blood is expected.  AFTER A DEBRIDEMENT:    Keep your wound clean and dry. Do not remove the dressing unless instructed.  Follow the wound care orders provided to you or your home health care provider.  If you have pain, take over the counter pain relievers or pain medication if prescribed.  Elevate the wound and limit excessive activity to prevent bleeding and/or swelling in your wound.  If you see blood coming through the dressing, apply gauze and tape over the dressing and hold firm pressure to the wound with your hand for 5-10 minutes continuously, without peeking, to help the bleeding stop.  Contact [facility] wound care team at [phone] or go to the nearest Emergency department if:    You have a fever greater than 101 taken by mouth.  Your pain gets worse or does not go away, even after taking your regular pain medicine.  Your skin around your wound is red, hot, swollen, or draining pus.  You have bleeding that continues to come through the dressing after holding pressure for 10 minutes       Compression: You may be using a compressive type of dressings to control edema: If so,  keep your compression wrap or tubigrip in place. Do not get them wet, they should feel snug. If they feel tight, or cause pain in any way,  elevate your legs above your heart for 15 minutes. If the wraps still cause pain or you can not tolerate compression,  please remove and notify the clinic or your home health.     Nutrition:  The current daily value (%DV) for protein is 50 grams per day and is meant as a general goal for most people. Further increasing your dietary protein intake is very important for wound healing. Typically one needs over 100g of protein per day to help with wound healing needs.  If you are a dialysis patient or have problems with your kidneys, talk to your Nephrologist about how much protein you can take in with your condition.  Examples of high protein items that can be added to your diet include: eggs, chicken, red meats, almonds, cottage cheese, Greek yogurt, beans, and peanut butter.  Fortified protein bars, shakes and drinks can add 15-30 additional grams of protein per serving.   Also add:   1 daily general multivitamin   Bhaskar : 1 packet twice daily   Vitamin C : 500mg twice daily   Zinc 220 mg daily  Vit D : once daily    Offloading   Offload your wound. This means to reduce pressure on and around the wound that reduces blood flow to the wound and prevents healing. Your wound care team will discuss specific ways for you to offload your specific wound. Common offloading strategies include:    Turn or reposition every 2 hours or sooner  Use pillows, wedges, ROHO wheelchair cushions or other special devices like boots and shoes to lift the wound off of hard surfaces  Alternating Low Air-loss (ALAL) mattress may be ordered  Padded dressings can reduce wound pressure        Call our [facility] wound clinic for questions/concerns a [phone] .

## 2024-08-16 NOTE — PROGRESS NOTES
[facility]      Subjective:       Patient ID: Aleisha Hernandez is a 84 y.o. male.    Chief Complaint: Wound Consult    NEW PATIENT    CC: LUE skin tears    84-year-old white male fell during the night getting back into his bed when his knee gave out in the early morning hours of August 14th.  He followed up that same morning with his PCP Dr. Canales who referred him to this clinic.  Patient comes in today on 8/16/2 4.  He is here with his wife.  Patient states his knee gave out which is what caused the fall.  He was actually scheduled to have a knee effusion drained that same day that he fell.  He called his left arm around the bed some how and caused multiple skin tears on the dorsal side as well as the posterior side.  He is not on blood thinners. Not a diabetic; nonsmoker      Past Medical History:  No date: BPH (benign prostatic hyperplasia)  No date: Nocturnal leg cramps  Past Surgical History:  No date: ANTERIOR CRUCIATE LIGAMENT REPAIR      Comment:  Shoulder  8/7/2024: BELPHAROPTOSIS REPAIR; Bilateral      Comment:  Procedure: REPAIR, BLEPHAROPTOSIS;  Surgeon: Murphy Mercer MD;  Location: [facility] OR;  Service: ENT;                 Laterality: Bilateral;  8/7/2024: BLEPHAROPLASTY, UPPER EYELID; Right      Comment:  Procedure: BLEPHAROPLASTY, UPPER EYELID;  Surgeon:                Murphy Mercer MD;  Location: [facility] OR;  Service: ENT;               Laterality: Right;  No date: CLOSURE OF DEFECT OF MOHS PROCEDURE  No date: KNEE ARTHROSCOPY; Right  03/05/2024: PHACOEMULSIFICATION, CATARACT, WITH IOL INSERTION; Left      Comment:  Procedure: PHACOEMULSIFICATION, CATARACT, WITH IOL                INSERTION- OS;  Surgeon: Arsalan See MD;  Location:                [facility] OR;  Service: Ophthalmology;  Laterality: Left;  03/26/2024: PHACOEMULSIFICATION, CATARACT, WITH IOL INSERTION; Right      Comment:  Procedure: PHACOEMULSIFICATION, CATARACT, WITH IOL         "        INSERTION- OD;  Surgeon: Arsalan See MD;  Location:                [facility] OR;  Service: Ophthalmology;  Laterality: Right;  No date: PROSTATE SURGERY  8/7/2024: REPAIR,BROW PTOSIS; N/A      Comment:  Procedure: Mid-Forehead Lift, Bilateral Ptosis Repair-                External Approach;  Surgeon: Murphy Mercer MD;                 Location: [facility] OR;  Service: ENT;  Laterality: N/A;          Review of Systems   Constitutional: Negative.    Respiratory: Negative.     Cardiovascular: Negative.    Gastrointestinal: Negative.    Skin:  Positive for wound.   Neurological: Negative.          Objective:      Vitals:    08/16/24 0805   BP: 137/76   Pulse: (!) 59   Resp: 16   Temp: 97.5 °F (36.4 °C)     @poctglucose@  No results for input(s): "POCTGLUCOSE" in the last 24 hours.  Physical Exam  Vitals reviewed.   Constitutional:       Appearance: He is not ill-appearing.      Comments: Very pleasant and talkative   Pulmonary:      Effort: Pulmonary effort is normal.   Musculoskeletal:         General: Normal range of motion.        Arms:    Skin:     Capillary Refill: Capillary refill takes less than 2 seconds.   Neurological:      General: No focal deficit present.      Mental Status: He is alert and oriented to person, place, and time. Mental status is at baseline.              Wound 08/16/24 0828 Traumatic Left posterior;lower Arm #1 (Active)   08/16/24 0828   Present on Original Admission: Y   Primary Wound Type: Traumatic   Side: Left   Orientation: posterior;lower   Location: Arm   Wound Approximate Age at First Assessment (Weeks):    Wound Number: #1   Is this injury device related?:    Incision Type:    Closure Method:    Wound Description (Comments):    Type:    Additional Comments:    Ankle-Brachial Index:    Pulses:    Removal Indication and Assessment:    Wound Outcome:    Wound Image   08/16/24 0848   Dressing Appearance Intact;Moist drainage 08/16/24 0848   Drainage Amount Moderate 08/16/24 0848 "   Drainage Characteristics/Odor Serosanguineous 08/16/24 0848   Appearance Pink;Red;Maroon;Purple;Moist 08/16/24 0848   Periwound Area Intact;Purple 08/16/24 0848   Wound Edges Defined 08/16/24 0848   Wound Length (cm) 16 cm 08/16/24 0848   Wound Width (cm) 5 cm 08/16/24 0848   Wound Depth (cm) 0.1 cm 08/16/24 0848   Wound Volume (cm^3) 8 cm^3 08/16/24 0848   Wound Surface Area (cm^2) 80 cm^2 08/16/24 0848   Care Cleansed with:;Antimicrobial agent;Soap and water;Applied: 08/16/24 0848            Wound 08/16/24 0830 Traumatic Left lower;anterior Arm #2 (Active)   08/16/24 0830   Present on Original Admission: Y   Primary Wound Type: Traumatic   Side: Left   Orientation: lower;anterior   Location: Arm   Wound Approximate Age at First Assessment (Weeks):    Wound Number: #2   Is this injury device related?:    Incision Type:    Closure Method:    Wound Description (Comments):    Type:    Additional Comments:    Ankle-Brachial Index:    Pulses:    Removal Indication and Assessment:    Wound Outcome:    Wound Image   08/16/24 0850   Dressing Appearance Intact;Moist drainage 08/16/24 0850   Drainage Amount Moderate 08/16/24 0850   Drainage Characteristics/Odor Serosanguineous 08/16/24 0850   Appearance Pink;Red;Maroon;Purple;Moist 08/16/24 0850   Tissue loss description Full thickness 08/16/24 0850   Periwound Area Intact;Purple 08/16/24 0850   Wound Edges Defined 08/16/24 0850   Wound Length (cm) 9.5 cm 08/16/24 0850   Wound Width (cm) 3.5 cm 08/16/24 0850   Wound Depth (cm) 0.1 cm 08/16/24 0850   Wound Volume (cm^3) 3.325 cm^3 08/16/24 0850   Wound Surface Area (cm^2) 33.25 cm^2 08/16/24 0850   Care Cleansed with:;Antimicrobial agent;Soap and water;Applied: 08/16/24 0850           Assessment:       1. Skin tear of left upper extremity    2. Benign prostatic hyperplasia without lower urinary tract symptoms    3. Essential hypertension, benign    4. Osteoarthritis of knee, unspecified laterality, unspecified  "osteoarthritis type              Lab Results   Component Value Date    WBC 9.73 06/18/2024    HGB 14.1 06/18/2024    HCT 44.1 06/18/2024    MCV 92.8 06/18/2024     06/18/2024         CMP  Sodium   Date Value Ref Range Status   06/18/2024 140 136 - 145 mmol/L Final     Potassium   Date Value Ref Range Status   06/18/2024 4.8 3.5 - 5.1 mmol/L Final     Chloride   Date Value Ref Range Status   06/18/2024 107 98 - 107 mmol/L Final     CO2   Date Value Ref Range Status   06/18/2024 28 23 - 31 mmol/L Final     Blood Urea Nitrogen   Date Value Ref Range Status   06/18/2024 17.5 8.4 - 25.7 mg/dL Final     Creatinine   Date Value Ref Range Status   06/18/2024 0.98 0.73 - 1.18 mg/dL Final     Calcium   Date Value Ref Range Status   06/18/2024 9.2 8.8 - 10.0 mg/dL Final     Albumin   Date Value Ref Range Status   06/18/2024 3.9 3.4 - 4.8 g/dL Final     Bilirubin Total   Date Value Ref Range Status   06/18/2024 0.7 <=1.5 mg/dL Final     ALP   Date Value Ref Range Status   06/18/2024 61 40 - 150 unit/L Final     AST   Date Value Ref Range Status   06/18/2024 15 5 - 34 unit/L Final     ALT   Date Value Ref Range Status   06/18/2024 15 0 - 55 unit/L Final     eGFR   Date Value Ref Range Status   06/18/2024 >60 mL/min/1.73/m2 Final     Lab Results   Component Value Date    HGBA1C 5.4 10/11/2023     No results found for: "SEDRATE"  No results found for: "CRP"    Plan:     Plan of Care:    This is a new patient to this clinic to help treat multitude of skin avulsions on his left upper extremity after he had a fall a couple of days ago.  He comes in today with his wife on August 16th.  I reviewed old records  Patient has several skin avulsions with rolled epidermal skin that has rolled under itself and become nonviable.  All the nonviable areas had to be removed with scissors and pickups.  Entire upper extremity was washed and cleaned  I discussed the plan of care with the patient in his wife which will be to keep this area " moist which will include topical mupirocin and then Adaptic and then covering the outside with a Mario a Kerlix wrap.  His wife can do this daily.  They can wash it daily with baby shampoo and water  Supplies will be ordered  Nutrition: Must have a high protein diet to support wound  healing; (if renal disease, see nephrologist for amount allowed):  this should be over 100g protein /day (if no kidney issues); Also rec MVI along with vit C, vit D, zinc and Bhaskar  Return to clinic 1 week           The time spent including preparing to see the patient, obtaining patient history and assessment, evaluation of the plan of care, patient/caregiver counseling and education, orders, documentation, coordination of care, and other professional medical management activities for today's encounter was 30 minutes.    Time spent performing procedures during today's encounter was 15 minutes.

## 2024-08-19 ENCOUNTER — TELEPHONE (OUTPATIENT)
Dept: PRIMARY CARE CLINIC | Facility: CLINIC | Age: 84
End: 2024-08-19
Payer: MEDICARE
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
## 2024-08-19 NOTE — TELEPHONE ENCOUNTER
----- Message from Linda Gage LPN sent at 8/19/2024  9:57 AM CDT -----  Pt states that tramadol is not working for pain. Rates pain at 4-5 constantly and a 10 during dressing changes. [facility] sent him home with dressing supplies to change dressing himself. Pt reports he passed out Sunday while changing dressing and scabs peeled off. Pt is concerned about infection due to constant peeling of scabs and would being open.  ----- Message -----  From: Carmine Holguin  Sent: 8/19/2024   8:58 AM CDT  To: [facility] Staff    .Who Called: Aleisha Hernandez    Caller is requesting assistance/information from provider's office.    Symptoms (please be specific): pain   How long has patient had these symptoms:    List of preferred pharmacies on file (remove unneeded): [unfilled]  If different, enter pharmacy into here including location and phone number: n/a      Preferred Method of Contact: Phone Call  Patient's Preferred Phone Number on File: [phone]   Best Call Back Number, if different:  Additional Information: medication not working, pt called wanting to speak with nurse

## 2024-08-19 NOTE — TELEPHONE ENCOUNTER
Pt called to report that tramadol is not working for pain. Rates pain at 4-5 constantly and a 10 during dressing changes. [facility] sent him home with dressing supplies to change dressing himself. Pt reports he passed out Sunday while changing dressing and scabs peeled off. Pt is concerned about infection due to constant peeling of scabs and would being open. Advised pt that a message would be sent to doctor. Pt verbalized understanding.

## 2024-08-19 NOTE — TELEPHONE ENCOUNTER
If Tramadol isn't effective, we would need to try Norco. This is the next step up in terms of potency.    It looks like he may have taken Norco in 2019 while he was inpatient, but I can't confirm. The reason that this is important is that he has a codeine allergy listed. Norco and Codeine are similar. So, he may have an allergic reaction to Norco.     If he developed any itching, rash, difficulty breathing, or palpitations (his listed reaction to codeine), then he would either need to present to the ER or contact us immediately depending on severity.     Please let me know if he's interested in trying [place].    Murphy Canales MD

## 2024-08-20 ENCOUNTER — HOSPITAL ENCOUNTER (OUTPATIENT)
Dept: WOUND CARE | Facility: HOSPITAL | Age: 84
Discharge: HOME | End: 2024-08-20
Attending: STUDENT IN AN ORGANIZED HEALTH CARE EDUCATION/TRAINING PROGRAM
Payer: MEDICARE

## 2024-08-20 VITALS
BODY MASS INDEX: 22.81 KG/M2 | HEART RATE: 60 BPM | DIASTOLIC BLOOD PRESSURE: 80 MMHG | WEIGHT: 154 LBS | SYSTOLIC BLOOD PRESSURE: 157 MMHG | HEIGHT: 69 IN | RESPIRATION RATE: 18 BRPM | TEMPERATURE: 98 F

## 2024-08-20 DIAGNOSIS — S41.112A SKIN TEAR OF LEFT UPPER EXTREMITY: Primary | ICD-10-CM

## 2024-08-20 PROCEDURE — 99212 OFFICE O/P EST SF 10 MIN: CPT | Mod: ,,, | Performed by: EMERGENCY MEDICINE

## 2024-08-20 PROCEDURE — 99212 OFFICE O/P EST SF 10 MIN: CPT

## 2024-08-20 PROCEDURE — 27000999 HC MEDICAL RECORD PHOTO DOCUMENTATION

## 2024-08-20 RX ORDER — GABAPENTIN 100 MG/1
100 CAPSULE ORAL 3 TIMES DAILY
Qty: 90 CAPSULE | Refills: 1 | Status: SHIPPED | OUTPATIENT
Start: 2024-08-20

## 2024-08-20 NOTE — PATIENT INSTRUCTIONS
Pt seen today by: Dr. Mellisa Campbell    Supplies ordered on 8/16/2024 from [facility]    DRESSING INSTRUCTIONS:    Wound location: Left Posterior and Left Anterior Forearm    Dressings to be changed every 3 days and as needed if soiled or not intact.  Patient and/or family may be asked to assist on other days.      Cleanse wound with wound cleanser or baby shampoo  Apply mupirocin ointment  Apply versatel to the wound bed  Cover with an abd pad then wrap with kerlix and secure with tape       Compression with: N/A    Return visit: Friday August 23, 2024, at 11:00 am.       Wound may have been debrided in clinic: if so, WHAT YOU NEED TO KNOW:    Debridement is the removal of infected, damaged, or dead tissue so a wound can heal properly. Your wound may need more than one debridement. Debridement can cause bleeding, and a small amount of blood is expected.  AFTER A DEBRIDEMENT:    Keep your wound clean and dry. Do not remove the dressing unless instructed.  Follow the wound care orders provided to you or your home health care provider.  If you have pain, take over the counter pain relievers or pain medication if prescribed.  Elevate the wound and limit excessive activity to prevent bleeding and/or swelling in your wound.  If you see blood coming through the dressing, apply gauze and tape over the dressing and hold firm pressure to the wound with your hand for 5-10 minutes continuously, without peeking, to help the bleeding stop.  Contact [facility] wound care team at [phone] or go to the nearest Emergency department if:    You have a fever greater than 101 taken by mouth.  Your pain gets worse or does not go away, even after taking your regular pain medicine.  Your skin around your wound is red, hot, swollen, or draining pus.  You have bleeding that continues to come through the dressing after holding pressure for 10 minutes       Compression: You may be using a compressive type of dressings to control  edema: If so, keep your compression wrap or tubigrip in place. Do not get them wet, they should feel snug. If they feel tight, or cause pain in any way,  elevate your legs above your heart for 15 minutes. If the wraps still cause pain or you can not tolerate compression,  please remove and notify the clinic or your home health.     Nutrition:  The current daily value (%DV) for protein is 50 grams per day and is meant as a general goal for most people. Further increasing your dietary protein intake is very important for wound healing. Typically one needs over 100g of protein per day to help with wound healing needs.  If you are a dialysis patient or have problems with your kidneys, talk to your Nephrologist about how much protein you can take in with your condition.  Examples of high protein items that can be added to your diet include: eggs, chicken, red meats, almonds, cottage cheese, Greek yogurt, beans, and peanut butter.  Fortified protein bars, shakes and drinks can add 15-30 additional grams of protein per serving.   Also add:   1 daily general multivitamin   Bhaskar : 1 packet twice daily   Vitamin C : 500mg twice daily   Zinc 220 mg daily  Vit D : once daily    Offloading   Offload your wound. This means to reduce pressure on and around the wound that reduces blood flow to the wound and prevents healing. Your wound care team will discuss specific ways for you to offload your specific wound. Common offloading strategies include:    Turn or reposition every 2 hours or sooner  Use pillows, wedges, ROHO wheelchair cushions or other special devices like boots and shoes to lift the wound off of hard surfaces  Alternating Low Air-loss (ALAL) mattress may be ordered  Padded dressings can reduce wound pressure        Call our [facility] wound clinic for questions/concerns a [phone] .

## 2024-08-20 NOTE — TELEPHONE ENCOUNTER
There's not a lot of other options. In terms of potency, there's Tramadol, then hydrocodone (Norco), then oxycodone (Percocet).   Hydrocodone and Oxycodone are very similar. If he didn't tolerate Norco (hydrocodone) in the past, he probably won't tolerate Percocet (oxycodone).   He's already on the max dose of Tramadol  He's already supplementing with max dose of Tylenol.    We can try some gabapentin which reduces pain transmission by nerves. He's taken this in the past and reported it worsened his leg cramps, but we could try it again.     Let me know how he'd like to proceed.     This is about the most I can offer for pain. If his pain is not controllable in the outpatient setting, we may have to direct him to the ER for severe pain.     Murphy Canales MD

## 2024-08-20 NOTE — PROGRESS NOTES
"  Outpatient Wound Care and [facility] Clinic      Subjective:       Patient ID: Aleisha Hernandez is a 84 y.o. male.    Chief Complaint: Wound Check    CC: DARRELL skin tears    84-year-old white male fell during the night getting back into his bed when his knee gave out in the early morning hours of August 14th.  He followed up that same morning with his PCP Dr. Canales who referred him to this clinic.  I met him on 08/16/24.  Patient was on Ultram per PCP for the pain from the wounds.  I noted several skin avulsions to SHANTAE with rolled epidermal skin that has rolled under itself and become nonviable.  All the nonviable areas had to be removed with scissors and pickups.  Entire upper extremity was washed and cleaned and he was advised of topical antimicrobial ointment and adaptic and to keep covered.  Evidently him in his wife have been having trouble since then.  Staff tells me patient had called yesterday saying that he had not received his supplies yet.  Records indicate he also called the PCP's office requesting something stronger than Ultram for his pain.  He was given a prescription for gabapentin.  He was also requesting Ambien.  This morning patient called the clinic reporting that it was hurting too bad to do his own dressing changes so we offered for him to come in today on August 20th for us to help.  Patient feels burning pains along the open wounds         Review of Systems   Constitutional: Negative.    Respiratory: Negative.     Cardiovascular: Negative.    Gastrointestinal: Negative.    Skin:  Positive for wound.   Neurological: Negative.          Objective:      Vitals:    08/20/24 1425   BP: (!) 157/80   Pulse: 60   Resp: 18   Temp: 97.9 °F (36.6 °C)       @poctglucose@  No results for input(s): "POCTGLUCOSE" in the last 24 hours.  Physical Exam  Vitals reviewed.   Constitutional:       Appearance: He is not ill-appearing.   Pulmonary:      Effort: Pulmonary effort is normal.   Musculoskeletal:       " "  General: Normal range of motion.        Arms:    Skin:     Capillary Refill: Capillary refill takes less than 2 seconds.   Neurological:      General: No focal deficit present.      Mental Status: He is alert and oriented to person, place, and time. Mental status is at baseline.                    Assessment:       1. Skin tear of left upper extremity          Left upper extremity skin tears s/p fall on 8/14/24:  1st clinic visit 8/16/24      CMP  Sodium   Date Value Ref Range Status   06/18/2024 140 136 - 145 mmol/L Final     Potassium   Date Value Ref Range Status   06/18/2024 4.8 3.5 - 5.1 mmol/L Final     Chloride   Date Value Ref Range Status   06/18/2024 107 98 - 107 mmol/L Final     CO2   Date Value Ref Range Status   06/18/2024 28 23 - 31 mmol/L Final     Blood Urea Nitrogen   Date Value Ref Range Status   06/18/2024 17.5 8.4 - 25.7 mg/dL Final     Creatinine   Date Value Ref Range Status   06/18/2024 0.98 0.73 - 1.18 mg/dL Final     Calcium   Date Value Ref Range Status   06/18/2024 9.2 8.8 - 10.0 mg/dL Final     Albumin   Date Value Ref Range Status   06/18/2024 3.9 3.4 - 4.8 g/dL Final     Bilirubin Total   Date Value Ref Range Status   06/18/2024 0.7 <=1.5 mg/dL Final     ALP   Date Value Ref Range Status   06/18/2024 61 40 - 150 unit/L Final     AST   Date Value Ref Range Status   06/18/2024 15 5 - 34 unit/L Final     ALT   Date Value Ref Range Status   06/18/2024 15 0 - 55 unit/L Final     eGFR   Date Value Ref Range Status   06/18/2024 >60 mL/min/1.73/m2 Final     Lab Results   Component Value Date    HGBA1C 5.4 10/11/2023     No results found for: "SEDRATE"  No results found for: "CRP"    Plan:     Plan of Care:    Patient comes in today on 8/20 after he called our office this morning complaining of trouble doing his dressing change because everything was too painful and he could not get the dressing off.  The nurses took down his dressings and cleaned the wounds.  They all look fine and or what we " would expect at this phase and actually better than they looked just a few days ago  Pt felt adaptic stuck on wound bed.  Let us try Mepitel/versitel dressings only with ointments; this versitel can stay in plaace for several days and wife would only need to change the outside dressings  nd she can leave that in place and in contact with the open wound and keep covered.  He will return later this week as scheduled for us to recheck and see how he is doing.   The time spent including preparing to see the patient, obtaining patient history and assessment, evaluation of the plan of care, patient/caregiver counseling and education, orders, documentation, coordination of care, and other professional medical management activities for today's encounter was 15 minutes.

## 2024-08-23 ENCOUNTER — HOSPITAL ENCOUNTER (OUTPATIENT)
Dept: WOUND CARE | Facility: HOSPITAL | Age: 84
Discharge: HOME | End: 2024-08-23
Attending: EMERGENCY MEDICINE
Payer: MEDICARE

## 2024-08-23 VITALS
HEART RATE: 66 BPM | HEIGHT: 69 IN | RESPIRATION RATE: 16 BRPM | TEMPERATURE: 98 F | BODY MASS INDEX: 22.81 KG/M2 | DIASTOLIC BLOOD PRESSURE: 80 MMHG | WEIGHT: 154 LBS | SYSTOLIC BLOOD PRESSURE: 133 MMHG

## 2024-08-23 DIAGNOSIS — M17.9 OSTEOARTHRITIS OF KNEE, UNSPECIFIED LATERALITY, UNSPECIFIED OSTEOARTHRITIS TYPE: ICD-10-CM

## 2024-08-23 DIAGNOSIS — S41.112A SKIN TEAR OF LEFT UPPER EXTREMITY: Primary | ICD-10-CM

## 2024-08-23 DIAGNOSIS — I10 ESSENTIAL HYPERTENSION, BENIGN: ICD-10-CM

## 2024-08-23 DIAGNOSIS — N40.0 BENIGN PROSTATIC HYPERPLASIA WITHOUT LOWER URINARY TRACT SYMPTOMS: ICD-10-CM

## 2024-08-23 PROCEDURE — 99212 OFFICE O/P EST SF 10 MIN: CPT | Mod: ,,, | Performed by: EMERGENCY MEDICINE

## 2024-08-23 PROCEDURE — 27000999 HC MEDICAL RECORD PHOTO DOCUMENTATION

## 2024-08-23 PROCEDURE — 99212 OFFICE O/P EST SF 10 MIN: CPT

## 2024-08-23 RX ORDER — MUPIROCIN 20 MG/G
OINTMENT TOPICAL DAILY
Qty: 30 G | Refills: 0 | Status: SHIPPED | OUTPATIENT
Start: 2024-08-23 | End: 2024-09-02

## 2024-08-23 NOTE — PROGRESS NOTES
"  Outpatient Wound Care and Hyperbaric Clinic      Subjective:       Patient ID: Aleisha Hernandez is a 84 y.o. male.    Chief Complaint: Wound Check    CC: LUPINO skin tears    84-year-old white male fell during the night getting back into his bed when his knee gave out in the early morning hours of August 14th.  He followed up that same morning with his PCP Dr. Canales who referred him to this clinic.  I met him on 08/16/24.  Patient was on Ultram per PCP for the pain from the wounds.  I noted several skin avulsions to LUE with rolled epidermal skin that has rolled under itself and become nonviable.  All the nonviable areas had to be removed with scissors and pickups.  Entire upper extremity was washed and cleaned and he was advised of topical antimicrobial ointment and adaptic and to keep covered.  Patient came in sooner than scheduled on 8/20/24 as they were having a lot of trouble removing the dressings and patient was intolerant and in pain.  We changed the dressing and adjusted it to avoid any sticking and are drying out. (Mepitel) The wound though did look better already.  Comes in today on 08/23.  States the dressing as tolerated much better and the wound again looks better        Review of Systems   Constitutional: Negative.    Respiratory: Negative.     Cardiovascular: Negative.    Gastrointestinal: Negative.    Skin:  Positive for wound.   Neurological: Negative.          Objective:      Vitals:    08/23/24 1056   BP: 133/80   Pulse: 66   Resp: 16   Temp: 98 °F (36.7 °C)         @poctglucose@  No results for input(s): "POCTGLUCOSE" in the last 24 hours.  Physical Exam  Vitals reviewed.   Constitutional:       Appearance: He is not ill-appearing.   Pulmonary:      Effort: Pulmonary effort is normal.   Musculoskeletal:         General: Normal range of motion.        Arms:    Skin:     Capillary Refill: Capillary refill takes less than 2 seconds.   Neurological:      General: No focal deficit present.      " Mental Status: He is alert and oriented to person, place, and time. Mental status is at baseline.              Wound 08/16/24 0828 Traumatic Left posterior;lower Arm #1 (Active)   08/16/24 0828   Present on Original Admission: Y   Primary Wound Type: Traumatic   Side: Left   Orientation: posterior;lower   Location: Arm   Wound Approximate Age at First Assessment (Weeks):    Wound Number: #1   Is this injury device related?:    Incision Type:    Closure Method:    Wound Description (Comments):    Type:    Additional Comments:    Ankle-Brachial Index:    Pulses:    Removal Indication and Assessment:    Wound Outcome:    Wound Image   08/23/24 1116   Dressing Appearance Intact;Moist drainage 08/23/24 1116   Drainage Amount Moderate 08/23/24 1116   Drainage Characteristics/Odor Serosanguineous;Yellow 08/23/24 1116   Appearance Pink;Yellow;Epithelialization 08/23/24 1116   Tissue loss description Partial thickness 08/23/24 1116   Black (%), Wound Tissue Color 10 % 08/23/24 1116   Red (%), Wound Tissue Color 60 % 08/23/24 1116   Yellow (%), Wound Tissue Color 30 % 08/23/24 1116   Periwound Area Intact;Dry 08/23/24 1116   Wound Edges Irregular 08/23/24 1116   Wound Length (cm) 16 cm 08/23/24 1116   Wound Width (cm) 5.5 cm 08/23/24 1116   Wound Depth (cm) 0.1 cm 08/23/24 1116   Wound Volume (cm^3) 8.8 cm^3 08/23/24 1116   Wound Surface Area (cm^2) 88 cm^2 08/23/24 1116   Care Cleansed with:;Antimicrobial agent;Applied:;Soap and water 08/23/24 1116   Dressing Removed;Changed;Non-adherent;Absorptive Pad;Rolled gauze 08/23/24 1116            Wound 08/16/24 0830 Traumatic Left lower;anterior Arm #2 (Active)   08/16/24 0830   Present on Original Admission: Y   Primary Wound Type: Traumatic   Side: Left   Orientation: lower;anterior   Location: Arm   Wound Approximate Age at First Assessment (Weeks):    Wound Number: #2   Is this injury device related?:    Incision Type:    Closure Method:    Wound Description (Comments):     Type:    Additional Comments:    Ankle-Brachial Index:    Pulses:    Removal Indication and Assessment:    Wound Outcome:    Wound Image   08/23/24 1114   Dressing Appearance Intact;Moist drainage 08/23/24 1114   Drainage Amount Moderate 08/23/24 1114   Drainage Characteristics/Odor Serosanguineous;Yellow 08/23/24 1114   Appearance Pink;Yellow;Epithelialization;Eschar 08/23/24 1114   Tissue loss description Partial thickness 08/23/24 1114   Black (%), Wound Tissue Color 10 % 08/23/24 1114   Red (%), Wound Tissue Color 60 % 08/23/24 1114   Yellow (%), Wound Tissue Color 30 % 08/23/24 1114   Periwound Area Intact;Dry 08/23/24 1114   Wound Edges Irregular 08/23/24 1114   Wound Length (cm) 8.5 cm 08/23/24 1114   Wound Width (cm) 4.5 cm 08/23/24 1114   Wound Depth (cm) 0.1 cm 08/23/24 1114   Wound Volume (cm^3) 3.825 cm^3 08/23/24 1114   Wound Surface Area (cm^2) 38.25 cm^2 08/23/24 1114   Care Cleansed with:;Soap and water;Applied: 08/23/24 1114   Dressing Removed;Changed;Non-adherent;Absorptive Pad;Rolled gauze 08/23/24 1114             Assessment:       1. Skin tear of left upper extremity    2. Benign prostatic hyperplasia without lower urinary tract symptoms    3. Essential hypertension, benign    4. Osteoarthritis of knee, unspecified laterality, unspecified osteoarthritis type          Left upper extremity skin tears s/p fall on 8/14/24:  1st clinic visit 8/16/24:          Plan:     Plan of Care:    The left upper extremity skin tears/skin avulsions or improving just in the one-week.   Continue to apply Mepitel/versitel  as the contact layer which can stay in place all week.  They can add mupirocin ointment to keep it moist and then keep it covered    Return to clinic one-week         The time spent including preparing to see the patient, obtaining patient history and assessment, evaluation of the plan of care, patient/caregiver counseling and education, orders, documentation, coordination of care, and other  professional medical management activities for today's encounter was 15 minutes.

## 2024-08-23 NOTE — PATIENT INSTRUCTIONS
Pt seen today by: Dr. Mellisa Campbell    Supplies ordered on 8/16/2024 from [facility]    DRESSING INSTRUCTIONS:    Wound location: Left Posterior and Left Anterior Forearm    Dressings to be changed every 3 days and as needed if soiled or not intact.  Patient and/or family may be asked to assist on other days.      Cleanse wound with wound cleanser or baby shampoo  Apply mupirocin ointment  Apply versatel to the wound bed  Cover with an abd pad then wrap with kerlix and secure with tape       Compression with: N/A    Return visit: Friday August 30, 2024, at 8:00 am.       Wound may have been debrided in clinic: if so, WHAT YOU NEED TO KNOW:    Debridement is the removal of infected, damaged, or dead tissue so a wound can heal properly. Your wound may need more than one debridement. Debridement can cause bleeding, and a small amount of blood is expected.  AFTER A DEBRIDEMENT:    Keep your wound clean and dry. Do not remove the dressing unless instructed.  Follow the wound care orders provided to you or your home health care provider.  If you have pain, take over the counter pain relievers or pain medication if prescribed.  Elevate the wound and limit excessive activity to prevent bleeding and/or swelling in your wound.  If you see blood coming through the dressing, apply gauze and tape over the dressing and hold firm pressure to the wound with your hand for 5-10 minutes continuously, without peeking, to help the bleeding stop.  Contact [facility] wound care team at [phone] or go to the nearest Emergency department if:    You have a fever greater than 101 taken by mouth.  Your pain gets worse or does not go away, even after taking your regular pain medicine.  Your skin around your wound is red, hot, swollen, or draining pus.  You have bleeding that continues to come through the dressing after holding pressure for 10 minutes       Compression: You may be using a compressive type of dressings to control  edema: If so, keep your compression wrap or tubigrip in place. Do not get them wet, they should feel snug. If they feel tight, or cause pain in any way,  elevate your legs above your heart for 15 minutes. If the wraps still cause pain or you can not tolerate compression,  please remove and notify the clinic or your home health.     Nutrition:  The current daily value (%DV) for protein is 50 grams per day and is meant as a general goal for most people. Further increasing your dietary protein intake is very important for wound healing. Typically one needs over 100g of protein per day to help with wound healing needs.  If you are a dialysis patient or have problems with your kidneys, talk to your Nephrologist about how much protein you can take in with your condition.  Examples of high protein items that can be added to your diet include: eggs, chicken, red meats, almonds, cottage cheese, Greek yogurt, beans, and peanut butter.  Fortified protein bars, shakes and drinks can add 15-30 additional grams of protein per serving.   Also add:   1 daily general multivitamin   Bhaskar : 1 packet twice daily   Vitamin C : 500mg twice daily   Zinc 220 mg daily  Vit D : once daily    Offloading   Offload your wound. This means to reduce pressure on and around the wound that reduces blood flow to the wound and prevents healing. Your wound care team will discuss specific ways for you to offload your specific wound. Common offloading strategies include:    Turn or reposition every 2 hours or sooner  Use pillows, wedges, ROHO wheelchair cushions or other special devices like boots and shoes to lift the wound off of hard surfaces  Alternating Low Air-loss (ALAL) mattress may be ordered  Padded dressings can reduce wound pressure        Call our [facility] wound clinic for questions/concerns a [phone] .

## 2024-08-30 ENCOUNTER — HOSPITAL ENCOUNTER (OUTPATIENT)
Dept: WOUND CARE | Facility: HOSPITAL | Age: 84
Discharge: HOME | End: 2024-08-30
Attending: STUDENT IN AN ORGANIZED HEALTH CARE EDUCATION/TRAINING PROGRAM
Payer: MEDICARE

## 2024-08-30 VITALS
TEMPERATURE: 98 F | SYSTOLIC BLOOD PRESSURE: 134 MMHG | RESPIRATION RATE: 16 BRPM | DIASTOLIC BLOOD PRESSURE: 84 MMHG | WEIGHT: 148 LBS | HEIGHT: 69 IN | HEART RATE: 59 BPM | BODY MASS INDEX: 21.92 KG/M2

## 2024-08-30 DIAGNOSIS — N40.0 BENIGN PROSTATIC HYPERPLASIA WITHOUT LOWER URINARY TRACT SYMPTOMS: ICD-10-CM

## 2024-08-30 DIAGNOSIS — M17.9 OSTEOARTHRITIS OF KNEE, UNSPECIFIED LATERALITY, UNSPECIFIED OSTEOARTHRITIS TYPE: ICD-10-CM

## 2024-08-30 DIAGNOSIS — I10 ESSENTIAL HYPERTENSION, BENIGN: ICD-10-CM

## 2024-08-30 DIAGNOSIS — S41.112A SKIN TEAR OF LEFT UPPER EXTREMITY: Primary | ICD-10-CM

## 2024-08-30 PROCEDURE — 27000999 HC MEDICAL RECORD PHOTO DOCUMENTATION

## 2024-08-30 PROCEDURE — 99212 OFFICE O/P EST SF 10 MIN: CPT | Mod: ,,, | Performed by: EMERGENCY MEDICINE

## 2024-08-30 PROCEDURE — 99212 OFFICE O/P EST SF 10 MIN: CPT

## 2024-08-30 NOTE — PROGRESS NOTES
"  Outpatient Wound Care and Hyperbaric Clinic      Subjective:       Patient ID: Aleisha Hernandez is a 84 y.o. male.    Chief Complaint: Wound Check    CC: LUPINO skin tears    84-year-old white male fell during the night getting back into his bed when his knee gave out in the early morning hours of August 14th.  He followed up that same morning with his PCP Dr. Canales who referred him to this clinic.  I met him on 08/16/24.  Patient was on Ultram per PCP for the pain from the wounds.  I noted several skin avulsions to LUE with rolled epidermal skin that has rolled under itself and become nonviable.  All the nonviable areas had to be removed with scissors and pickups.  Entire upper extremity was washed and cleaned and he was advised of topical antimicrobial ointment and adaptic and to keep covered.  Patient came in sooner than scheduled on 8/20/24 as they were having a lot of trouble removing the dressings and patient was intolerant and in pain.  We changed the dressing and adjusted it to avoid any sticking and are drying out. (Mepitel) The wound though did look better already.  Comes in today on 08/23.  States the dressing as tolerated much better and the wound again looks better      Review of Systems   Constitutional: Negative.    Respiratory: Negative.     Cardiovascular: Negative.    Gastrointestinal: Negative.    Skin:  Positive for wound.   Neurological: Negative.          Objective:      Vitals:    08/30/24 0807   BP: 134/84   Pulse: (!) 59   Resp: 16   Temp: 97.7 °F (36.5 °C)         @poctglucose@  No results for input(s): "POCTGLUCOSE" in the last 24 hours.  Physical Exam  Vitals reviewed.   Constitutional:       Appearance: He is not ill-appearing.   Pulmonary:      Effort: Pulmonary effort is normal.   Musculoskeletal:         General: Normal range of motion.        Arms:    Skin:     Capillary Refill: Capillary refill takes less than 2 seconds.   Neurological:      General: No focal deficit present.     "  Mental Status: He is alert and oriented to person, place, and time. Mental status is at baseline.            Wound 08/16/24 0828 Traumatic Left posterior;lower Arm #1 (Active)   08/16/24 0828   Present on Original Admission: Y   Primary Wound Type: Traumatic   Side: Left   Orientation: posterior;lower   Location: Arm   Wound Approximate Age at First Assessment (Weeks):    Wound Number: #1   Is this injury device related?:    Incision Type:    Closure Method:    Wound Description (Comments):    Type:    Additional Comments:    Ankle-Brachial Index:    Pulses:    Removal Indication and Assessment:    Wound Outcome:    Wound Image   08/30/24 0826   Dressing Appearance Intact;Dried drainage 08/30/24 0826   Drainage Amount Moderate 08/30/24 0826   Drainage Characteristics/Odor Serous;Yellow 08/30/24 0826   Appearance Pink;Red;Purple;Yellow;Epithelialization 08/30/24 0826   Tissue loss description Partial thickness 08/30/24 0826   Periwound Area Intact 08/30/24 0826   Wound Edges Irregular 08/30/24 0826   Wound Length (cm) 16 cm 08/30/24 0826   Wound Width (cm) 5.5 cm 08/30/24 0826   Wound Depth (cm) 0.1 cm 08/30/24 0826   Wound Volume (cm^3) 8.8 cm^3 08/30/24 0826   Wound Surface Area (cm^2) 88 cm^2 08/30/24 0826   Care Cleansed with:;Soap and water;Applied: 08/30/24 0826             Assessment:       1. Skin tear of left upper extremity    2. Benign prostatic hyperplasia without lower urinary tract symptoms    3. Essential hypertension, benign    4. Osteoarthritis of knee, unspecified laterality, unspecified osteoarthritis type          Left upper extremity skin tears s/p fall on 8/14/24:  1st clinic visit 8/16/24:          Plan:     Plan of Care:    The left upper extremity skin tears/skin avulsions or improving just in the one-week.   Continue to apply Mepitel/versitel  as the contact layer which can stay in place all week.  They can add mupirocin ointment to keep it moist and then keep it covered    Return to clinic  one-week         The time spent including preparing to see the patient, obtaining patient history and assessment, evaluation of the plan of care, patient/caregiver counseling and education, orders, documentation, coordination of care, and other professional medical management activities for today's encounter was 15 minutes.

## 2024-08-30 NOTE — PATIENT INSTRUCTIONS
Pt seen today by: Dr. Mellisa Campbell    Supplies ordered on 8/16/2024 from [facility]    DRESSING INSTRUCTIONS:    Wound location: Left Posterior and Left Anterior Forearm    Dressings to be changed daily and as needed if soiled or not intact.  Patient and/or family may be asked to assist on other days.      Cleanse wound with wound cleanser or baby shampoo  Apply mupirocin ointment  Apply versatel to the wound bed ( Leave on until next visit )  Cover with gauze then wrap with kerlix and secure with tape       Compression with: N/A    Return visit: Friday,September 6, 2024, at 10:30 am.       Wound may have been debrided in clinic: if so, WHAT YOU NEED TO KNOW:    Debridement is the removal of infected, damaged, or dead tissue so a wound can heal properly. Your wound may need more than one debridement. Debridement can cause bleeding, and a small amount of blood is expected.  AFTER A DEBRIDEMENT:    Keep your wound clean and dry. Do not remove the dressing unless instructed.  Follow the wound care orders provided to you or your home health care provider.  If you have pain, take over the counter pain relievers or pain medication if prescribed.  Elevate the wound and limit excessive activity to prevent bleeding and/or swelling in your wound.  If you see blood coming through the dressing, apply gauze and tape over the dressing and hold firm pressure to the wound with your hand for 5-10 minutes continuously, without peeking, to help the bleeding stop.  Contact [facility] wound care team at [phone] or go to the nearest Emergency department if:    You have a fever greater than 101 taken by mouth.  Your pain gets worse or does not go away, even after taking your regular pain medicine.  Your skin around your wound is red, hot, swollen, or draining pus.  You have bleeding that continues to come through the dressing after holding pressure for 10 minutes       Compression: You may be using a compressive type of  dressings to control edema: If so, keep your compression wrap or tubigrip in place. Do not get them wet, they should feel snug. If they feel tight, or cause pain in any way,  elevate your legs above your heart for 15 minutes. If the wraps still cause pain or you can not tolerate compression,  please remove and notify the clinic or your home health.     Nutrition:  The current daily value (%DV) for protein is 50 grams per day and is meant as a general goal for most people. Further increasing your dietary protein intake is very important for wound healing. Typically one needs over 100g of protein per day to help with wound healing needs.  If you are a dialysis patient or have problems with your kidneys, talk to your Nephrologist about how much protein you can take in with your condition.  Examples of high protein items that can be added to your diet include: eggs, chicken, red meats, almonds, cottage cheese, Greek yogurt, beans, and peanut butter.  Fortified protein bars, shakes and drinks can add 15-30 additional grams of protein per serving.   Also add:   1 daily general multivitamin   Bhaskar : 1 packet twice daily   Vitamin C : 500mg twice daily   Zinc 220 mg daily  Vit D : once daily    Offloading   Offload your wound. This means to reduce pressure on and around the wound that reduces blood flow to the wound and prevents healing. Your wound care team will discuss specific ways for you to offload your specific wound. Common offloading strategies include:    Turn or reposition every 2 hours or sooner  Use pillows, wedges, ROHO wheelchair cushions or other special devices like boots and shoes to lift the wound off of hard surfaces  Alternating Low Air-loss (ALAL) mattress may be ordered  Padded dressings can reduce wound pressure        Call our [facility] wound clinic for questions/concerns a [phone] .

## 2024-09-05 RX ORDER — MUPIROCIN 20 MG/G
OINTMENT TOPICAL DAILY
Qty: 22 G | Refills: 0 | Status: SHIPPED | OUTPATIENT
Start: 2024-09-05 | End: 2024-10-05

## 2024-09-06 ENCOUNTER — HOSPITAL ENCOUNTER (OUTPATIENT)
Dept: WOUND CARE | Facility: HOSPITAL | Age: 84
Discharge: HOME | End: 2024-09-06
Attending: EMERGENCY MEDICINE
Payer: MEDICARE

## 2024-09-06 VITALS
RESPIRATION RATE: 16 BRPM | DIASTOLIC BLOOD PRESSURE: 82 MMHG | HEART RATE: 64 BPM | BODY MASS INDEX: 21.92 KG/M2 | WEIGHT: 148 LBS | HEIGHT: 69 IN | SYSTOLIC BLOOD PRESSURE: 146 MMHG | TEMPERATURE: 98 F

## 2024-09-06 DIAGNOSIS — S41.112A SKIN TEAR OF LEFT UPPER EXTREMITY: Primary | ICD-10-CM

## 2024-09-06 PROCEDURE — 99212 OFFICE O/P EST SF 10 MIN: CPT | Mod: ,,, | Performed by: EMERGENCY MEDICINE

## 2024-09-06 PROCEDURE — 27000999 HC MEDICAL RECORD PHOTO DOCUMENTATION

## 2024-09-06 PROCEDURE — 99212 OFFICE O/P EST SF 10 MIN: CPT

## 2024-09-06 NOTE — PROGRESS NOTES
"  [facility]      Subjective:       Patient ID: Aleisha Hernandez is a 84 y.o. male.    Chief Complaint: Wound Check    CC: LUPINO skin tears    84-year-old white male fell during the night getting back into his bed when his knee gave out in the early morning hours of August 14th.  He followed up that same morning with his PCP Dr. Canales who referred him to this clinic.  I met him on 08/16/24.  Patient was on Ultram per PCP for the pain from the wounds.  I noted several skin avulsions to LUE with rolled epidermal skin that has rolled under itself and become nonviable.  All the nonviable areas had to be removed with scissors and pickups.  Entire upper extremity was washed and cleaned and he was advised of topical antimicrobial ointment and adaptic and to keep covered.  Patient came in sooner than scheduled on 8/20/24 as they were having a lot of trouble removing the dressings and patient was intolerant and in pain.  We changed the dressing and adjusted it to avoid any sticking and are drying out. (Mepitel) The wound though did look better already.  Improving each week and says much less pain today on 8/30/24        Review of Systems   Constitutional: Negative.    Respiratory: Negative.     Cardiovascular: Negative.    Gastrointestinal: Negative.    Skin:  Positive for wound.   Neurological: Negative.          Objective:      Vitals:    09/06/24 1033   BP: (!) 146/82   Pulse: 64   Resp: 16   Temp: 97.7 °F (36.5 °C)         @poctglucose@  No results for input(s): "POCTGLUCOSE" in the last 24 hours.  Physical Exam  Vitals reviewed.   Constitutional:       Appearance: He is not ill-appearing.   Pulmonary:      Effort: Pulmonary effort is normal.   Musculoskeletal:         General: Normal range of motion.        Arms:    Skin:     Capillary Refill: Capillary refill takes less than 2 seconds.   Neurological:      General: No focal deficit present.      Mental Status: He is alert and oriented " to person, place, and time. Mental status is at baseline.                      Assessment:       1. Skin tear of left upper extremity          Left upper extremity skin tears s/p fall on 8/14/24:  1st clinic visit 8/16/24:          Plan:     Plan of Care:    The left upper extremity skin tears/skin avulsions all  improving  Continue to apply Mepitel/versitel  as the contact layer which can stay in place all week.  They can add mupirocin ointment to keep it moist and then keep it covered    Return to clinic one-week         The time spent including preparing to see the patient, obtaining patient history and assessment, evaluation of the plan of care, patient/caregiver counseling and education, orders, documentation, coordination of care, and other professional medical management activities for today's encounter was 15 minutes.

## 2024-09-06 NOTE — PATIENT INSTRUCTIONS
Pt seen today by: Dr. Mellisa Campbell    Supplies ordered on 8/16/2024 from [facility]    DRESSING INSTRUCTIONS:    Wound location: Left Posterior and Left Anterior Forearm    Dressings to be changed daily and as needed if soiled or not intact.  Patient and/or family may be asked to assist on other days.      Cleanse wound with wound cleanser or baby shampoo  Apply mupirocin ointment  Apply versatel to the wound bed ( Change in one week )  Cover with gauze then wrap with kerlix and secure with tape       Compression with: N/A    Return visit: Thursday,September 19th, 2024, at 10:30 am.       Wound may have been debrided in clinic: if so, WHAT YOU NEED TO KNOW:    Debridement is the removal of infected, damaged, or dead tissue so a wound can heal properly. Your wound may need more than one debridement. Debridement can cause bleeding, and a small amount of blood is expected.  AFTER A DEBRIDEMENT:    Keep your wound clean and dry. Do not remove the dressing unless instructed.  Follow the wound care orders provided to you or your home health care provider.  If you have pain, take over the counter pain relievers or pain medication if prescribed.  Elevate the wound and limit excessive activity to prevent bleeding and/or swelling in your wound.  If you see blood coming through the dressing, apply gauze and tape over the dressing and hold firm pressure to the wound with your hand for 5-10 minutes continuously, without peeking, to help the bleeding stop.  Contact [facility] wound care team at [phone] or go to the nearest Emergency department if:    You have a fever greater than 101 taken by mouth.  Your pain gets worse or does not go away, even after taking your regular pain medicine.  Your skin around your wound is red, hot, swollen, or draining pus.  You have bleeding that continues to come through the dressing after holding pressure for 10 minutes       Compression: You may be using a compressive type of  dressings to control edema: If so, keep your compression wrap or tubigrip in place. Do not get them wet, they should feel snug. If they feel tight, or cause pain in any way,  elevate your legs above your heart for 15 minutes. If the wraps still cause pain or you can not tolerate compression,  please remove and notify the clinic or your home health.     Nutrition:  The current daily value (%DV) for protein is 50 grams per day and is meant as a general goal for most people. Further increasing your dietary protein intake is very important for wound healing. Typically one needs over 100g of protein per day to help with wound healing needs.  If you are a dialysis patient or have problems with your kidneys, talk to your Nephrologist about how much protein you can take in with your condition.  Examples of high protein items that can be added to your diet include: eggs, chicken, red meats, almonds, cottage cheese, Greek yogurt, beans, and peanut butter.  Fortified protein bars, shakes and drinks can add 15-30 additional grams of protein per serving.   Also add:   1 daily general multivitamin   Bhaskar : 1 packet twice daily   Vitamin C : 500mg twice daily   Zinc 220 mg daily  Vit D : once daily    Offloading   Offload your wound. This means to reduce pressure on and around the wound that reduces blood flow to the wound and prevents healing. Your wound care team will discuss specific ways for you to offload your specific wound. Common offloading strategies include:    Turn or reposition every 2 hours or sooner  Use pillows, wedges, ROHO wheelchair cushions or other special devices like boots and shoes to lift the wound off of hard surfaces  Alternating Low Air-loss (ALAL) mattress may be ordered  Padded dressings can reduce wound pressure        Call our [facility] wound clinic for questions/concerns a [phone] .

## 2024-09-07 ENCOUNTER — HOSPITAL ENCOUNTER (EMERGENCY)
Facility: HOSPITAL | Age: 84
Discharge: HOME | End: 2024-09-07
Attending: STUDENT IN AN ORGANIZED HEALTH CARE EDUCATION/TRAINING PROGRAM
Payer: MEDICARE

## 2024-09-07 VITALS
TEMPERATURE: 98 F | DIASTOLIC BLOOD PRESSURE: 80 MMHG | BODY MASS INDEX: 22.07 KG/M2 | RESPIRATION RATE: 15 BRPM | OXYGEN SATURATION: 97 % | HEIGHT: 69 IN | HEART RATE: 63 BPM | SYSTOLIC BLOOD PRESSURE: 152 MMHG | WEIGHT: 149 LBS
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
## 2024-09-07 DIAGNOSIS — S01.81XA LACERATION OF FOREHEAD, INITIAL ENCOUNTER: ICD-10-CM

## 2024-09-07 DIAGNOSIS — S09.90XA CLOSED HEAD INJURY, INITIAL ENCOUNTER: Primary | ICD-10-CM

## 2024-09-07 PROCEDURE — 12011 RPR F/E/E/N/L/M 2.5 CM/<: CPT

## 2024-09-07 PROCEDURE — 99284 EMERGENCY DEPT VISIT MOD MDM: CPT | Mod: 25

## 2024-09-07 NOTE — ED PROVIDER NOTES
Encounter Date: 9/7/2024    SCRIBE #1 NOTE: I, Nicki Singh, am scribing for, and in the presence of,  Elton Lynn MD. I have scribed the following portions of the note - Other sections scribed: HPI, ROS, PE.       History     Chief Complaint   Patient presents with    Fall     Pt had a slip and fall at home, -thinners, +LOC, GCS 15 on arrival. Recent surgery to face, healed incisions to forehead, also has a new laceration medial forehead with bleeding controlled.      Patient is an 84-year-old male presenting to the ED following a fall. The pt states he slipped and fell PTA after using the restroom, causing his forehead to strike the floor. He is reporting facial pain and a laceration to the mid forehead. He denies any LOC. He denies any neck or back pain. The pt notes that he was recently diagnosed with a UTI and placed on antibiotics.     The history is provided by the patient. No  was used.     Review of patient's allergies indicates:   Allergen Reactions    Codeine Itching, Palpitations, Rash and Shortness Of Breath     Past Medical History:   Diagnosis Date    BPH (benign prostatic hyperplasia)     Nocturnal leg cramps      Past Surgical History:   Procedure Laterality Date    ANTERIOR CRUCIATE LIGAMENT REPAIR      Shoulder    BELPHAROPTOSIS REPAIR Bilateral 8/7/2024    Procedure: REPAIR, BLEPHAROPTOSIS;  Surgeon: Murphy Mercer MD;  Location: [facility] OR;  Service: ENT;  Laterality: Bilateral;    BLEPHAROPLASTY, UPPER EYELID Right 8/7/2024    Procedure: BLEPHAROPLASTY, UPPER EYELID;  Surgeon: Murphy Mercer MD;  Location: [facility] OR;  Service: ENT;  Laterality: Right;    CLOSURE OF DEFECT OF MOHS PROCEDURE      KNEE ARTHROSCOPY Right     PHACOEMULSIFICATION, CATARACT, WITH IOL INSERTION Left 03/05/2024    Procedure: PHACOEMULSIFICATION, CATARACT, WITH IOL INSERTION- OS;  Surgeon: Arsalan See MD;  Location: [facility] OR;  Service: Ophthalmology;  Laterality: Left;     PHACOEMULSIFICATION, CATARACT, WITH IOL INSERTION Right 03/26/2024    Procedure: PHACOEMULSIFICATION, CATARACT, WITH IOL INSERTION- OD;  Surgeon: Arsalan See MD;  Location: [facility] OR;  Service: Ophthalmology;  Laterality: Right;    PROSTATE SURGERY      REPAIR,BROW PTOSIS N/A 8/7/2024    Procedure: Mid-Forehead Lift, Bilateral Ptosis Repair- External Approach;  Surgeon: Murphy Mercer MD;  Location: [facility] OR;  Service: ENT;  Laterality: N/A;     No family history on file.  Social History     Tobacco Use    Smoking status: Never    Smokeless tobacco: Never   Substance Use Topics    Alcohol use: Not Currently     Alcohol/week: 1.0 standard drink of alcohol     Types: 1 Shots of liquor per week    Drug use: Never     Review of Systems   Constitutional:  Negative for chills and fever.   HENT:  Negative for drooling and sore throat.         Facial pain.   Eyes:  Negative for pain and redness.   Respiratory:  Negative for shortness of breath, wheezing and stridor.    Cardiovascular:  Negative for chest pain, palpitations and leg swelling.   Gastrointestinal:  Negative for abdominal pain, nausea and vomiting.   Genitourinary:  Negative for dysuria and hematuria.   Musculoskeletal:  Negative for back pain, neck pain and neck stiffness.   Skin:  Positive for wound (laceration to the mid forehead). Negative for rash.   Neurological:  Negative for weakness and numbness.   Hematological:  Does not bruise/bleed easily.       Physical Exam     Initial Vitals [09/07/24 0017]   BP Pulse Resp Temp SpO2   (!) 140/82 69 18 97.6 °F (36.4 °C) 97 %      MAP       --         Physical Exam    Nursing note and vitals reviewed.  Constitutional: He appears well-developed.   HENT:   There is a linear laceration to the forehead with oozing.    Eyes: EOM are normal. Pupils are equal, round, and reactive to light.   Cardiovascular:  Normal rate and regular rhythm.           No murmur heard.  Pulmonary/Chest: Breath sounds normal. No  respiratory distress. He has no wheezes. He has no rales.   Abdominal: Abdomen is soft. He exhibits no distension. There is no abdominal tenderness.   Musculoskeletal:      Comments: No midline spinal tenderness.      Neurological: He is alert and oriented to person, place, and time. He has normal strength. No cranial nerve deficit or sensory deficit. GCS eye subscore is 4. GCS verbal subscore is 5. GCS motor subscore is 6.   The pt is moving all extremities.  Equal strength in all extremities.    Skin: Skin is warm. Capillary refill takes less than 2 seconds. No rash noted.         ED Course   Lac Repair    Date/Time: 9/7/2024 2:00 AM    Performed by: Elton Lynn MD  Authorized by: Elton Lynn MD    Consent:     Consent obtained:  Verbal    Consent given by:  Patient    Risks, benefits, and alternatives were discussed: yes      Risks discussed:  Need for additional repair, infection, poor cosmetic result, pain, poor wound healing and retained foreign body    Alternatives discussed:  Delayed treatment, observation and referral  Universal protocol:     Patient identity confirmed:  Verbally with patient  Anesthesia:     Anesthesia method:  Topical application    Topical anesthetic:  LET  Laceration details:     Location:  Face    Face location:  Forehead    Length (cm):  2  Pre-procedure details:     Preparation:  Imaging obtained to evaluate for foreign bodies and patient was prepped and draped in usual sterile fashion  Exploration:     Hemostasis achieved with:  Direct pressure    Imaging obtained comment:  CT    Imaging outcome: foreign body not noted      Wound exploration: wound explored through full range of motion and entire depth of wound visualized      Contaminated: no    Treatment:     Area cleansed with:  Saline    Amount of cleaning:  Standard    Irrigation solution:  Sterile saline    Visualized foreign bodies/material removed: no    Skin repair:     Repair method:  Steri-Strips and  tissue adhesive  Approximation:     Approximation:  Close  Repair type:     Repair type:  Simple  Post-procedure details:     Procedure completion:  Tolerated well, no immediate complications    Labs Reviewed - No data to display     ECG Results              EKG 12-lead (Final result)  Result time 09/17/24 08:23:47      Final result by Unknown User (09/17/24 08:23:47)                                      Imaging Results              CT Maxillofacial Without Contrast (Final result)  Result time 09/07/24 09:12:06      Final result by Kulwinder Vieyra MD (09/07/24 09:12:06)                   Impression:      1. There is a small contusion with focal subcutaneous emphysema in the visualized mid frontal scalp as seen in the cranial CT study. 2. No acute maxillofacial fracture identified. Details and other findings as noted above.      Electronically signed by: Kulwinder Vieyra  Date:    09/07/2024  Time:    09:12               Narrative:    CLINICAL HISTORY:  Trauma.    TECHNIQUE:  Maxillofacial CT was performed without  contrast. There are sagittal and coronal reconstructed images available for review.    Automatic exposure control was utilized to reduce the patient's radiation dose.    DLP = 1502    COMPARISON:  No prior imaging available for comparison.    FINDINGS:  Facial soft tissues: There is a small contusion with focal subcutaneous emphysema in the visualized mid frontal scalp as seen in the cranial CT study.Orbital soft tissues: The orbital soft tissues appear unremarkable.Bones:Orbital bony structures: The bilateral orbital bony structures are intact with no orbital fracture identified.Mandible: The mandible appears unremarkable with no fracture identified.Maxilla: The maxilla appears unremarkable with no fracture identified. The maxillary sinuses are intact bilaterally with no fractures.Pterygoid plates: No fracture identified of the right or left pterygoid plates.Zygoma: The zygomatic arches are intact.TMJ:  The mandibular condyles appear normally placed with respect to the mandibular fossa.Nasal Bones: The nasal septum is midline. No displaced nasal bone fracture is seen.Skull: No acute linear or depressed fracture is identified in the visualized skull.Paranasal sinuses: The visualized paranasal sinuses appear clear with no mucoperiosteal thickening or air fluid levels identified.Mastoid air cells: The visualized mastoid air cells appear clear.Brain: Intracranial findings discussed separately.                        Preliminary result by Devonte Lezama MD (09/07/24 01:45:04)                   Impression:    1. There is a small contusion with focal subcutaneous emphysema in the visualized mid frontal scalp as seen in the cranial CT study.  2. No acute maxillofacial fracture identified. Details and other findings as noted above.               Narrative:    START OF REPORT:  Technique: Noncontrast maxillofacial CT including orbits was performed with axial as well as sagittal and coronal images being submitted for interpretation.    Comparison: None.    Clinical history: Pt had a slip and fall at home, -thinners, +LOC, GCS 15 on arrival. Recent surgery to face, healed incisions to forehead, also has a new laceration medial forehead with bleeding controlled.    Findings:  Facial soft tissues: There is a small contusion with focal subcutaneous emphysema in the visualized mid frontal scalp as seen in the cranial CT study.  Orbital soft tissues: The orbital soft tissues appear unremarkable.  Bones:  Orbital bony structures: The bilateral orbital bony structures are intact with no orbital fracture identified.  Mandible: The mandible appears unremarkable with no fracture identified.  Maxilla: The maxilla appears unremarkable with no fracture identified. The maxillary sinuses are intact bilaterally with no fractures.  Pterygoid plates: No fracture identified of the right or left pterygoid plates.  Zygoma: The zygomatic arches  are intact.  TMJ: The mandibular condyles appear normally placed with respect to the mandibular fossa.  Nasal Bones: The nasal septum is midline. No displaced nasal bone fracture is seen.  Skull: No acute linear or depressed fracture is identified in the visualized skull.  Paranasal sinuses: The visualized paranasal sinuses appear clear with no mucoperiosteal thickening or air fluid levels identified.  Mastoid air cells: The visualized mastoid air cells appear clear.  Brain: Intracranial findings discussed separately.                                         CT Cervical Spine Without Contrast (Final result)  Result time 09/07/24 09:45:25      Final result by Kulwinder Vieyra MD (09/07/24 09:45:25)                   Impression:      1. No acute cervical spine fracture dislocation or subluxation is seen. 2. Degenerative changes and other details as above.      Electronically signed by: Kulwinder Vieyra  Date:    09/07/2024  Time:    09:45               Narrative:    EXAMINATION:  CT CERVICAL SPINE WITHOUT CONTRAST    CLINICAL HISTORY:  Neck trauma (Age >= 65y);    TECHNIQUE:  CT of the cervical spine Without contrast. Sagittal and coronal reconstructions were performed on the source images.    Automatic exposure control was utilized to reduce the patient's radiation dose.    DLP = 1502    COMPARISON:  No prior imaging available for comparison.    FINDINGS:  Lung apices: A 2 mm calcified granuloma is seen in the left lung apex (Series 7 Image 95). The visualized lung apices are otherwise unremarkable.Spine:Spinal canal: The spinal canal appears unremarkable.Mineralization: Within normal limits.Rotation: No significant rotation is seen.Scoliosis: No significant scoliosis is seen.Vertebral Fusion: No vertebral fusion is identified.Listhesis: No significant listhesis is identified.Lordosis: The cervical lordosis is maintained.Intervertebral disc spaces: Mildly decreased disc height is seen at C3-C4 through  C5-C6.Osteophytes: Mild multilevel endplate osteophytes are seen.Endplate Sclerosis: Subtle endplate sclerosis is seen off the opposing endplates at C3-C4 and C4-C5.Uncovertebral degenerative changes: Mild multilevel uncovertebral joint arthrosis is seen.Facet degenerative changes: Mild multilevel facet degenerative changes are seen.Fractures: No acute cervical spine fracture dislocation or subluxation is seen.                        Preliminary result by Devonte Lezama MD (09/07/24 01:44:14)                   Impression:    1. No acute cervical spine fracture dislocation or subluxation is seen.  2. Degenerative changes and other details as above.               Narrative:    START OF REPORT:  Technique: CT of the cervical spine was performed without intravenous contrast with axial as well as sagittal and coronal images.    Comparison: None.    Dosage Information: Automated Exposure Control was utilized 1502.20 mGy.cm.    Clinical history: Pt had a slip and fall at home, -thinners, +LOC, GCS 15 on arrival. Recent surgery to face, healed incisions to forehead, also has a new laceration medial forehead with bleeding controlled.    Findings:  Lung apices: A 2 mm calcified granuloma is seen in the left lung apex (Series 7 Image 95). The visualized lung apices are otherwise unremarkable.  Spine:  Spinal canal: The spinal canal appears unremarkable.  Mineralization: Within normal limits.  Rotation: No significant rotation is seen.  Scoliosis: No significant scoliosis is seen.  Vertebral Fusion: No vertebral fusion is identified.  Listhesis: No significant listhesis is identified.  Lordosis: The cervical lordosis is maintained.  Intervertebral disc spaces: Mildly decreased disc height is seen at C3-C4 through C5-C6.  Osteophytes: Mild multilevel endplate osteophytes are seen.  Endplate Sclerosis: Subtle endplate sclerosis is seen off the opposing endplates at C3-C4 and C4-C5.  Uncovertebral degenerative changes: Mild  multilevel uncovertebral joint arthrosis is seen.  Facet degenerative changes: Mild multilevel facet degenerative changes are seen.  Fractures: No acute cervical spine fracture dislocation or subluxation is seen.                                         CT Head Without Contrast (Final result)  Result time 09/07/24 09:10:41      Final result by Kulwinder Vieyra MD (09/07/24 09:10:41)                   Impression:      No acute intracranial hemorrhage.  Hematoma overlies the frontal bone with no underlying fracture.  Findings of chronic microvascular ischemic disease.      Electronically signed by: Kulwinder Vieyra  Date:    09/07/2024  Time:    09:10               Narrative:    EXAMINATION:  CT HEAD WITHOUT CONTRAST    CLINICAL HISTORY:  Facial trauma, blunt;Head trauma, minor (Age >= 65y);    TECHNIQUE:  Low dose axial images were obtained through the head.  Coronal and sagittal reformations were also performed. Contrast was not administered.    Automatic exposure control was utilized to reduce the patient's radiation dose.    DLP= 1502    COMPARISON:  None.    FINDINGS:  No acute intracranial hemorrhage, edema or mass. No acute parenchymal abnormality.    Diffuse cerebral atrophy with concordant ventricular enlargement.    There is normal gray white differentiation.    Hematoma overlies the frontal bone with no underlying fracture.    The mastoid air cells are clear.    The auditory canals are patent bilaterally.    The globes and orbital contents are normal bilaterally.    The visualized maxillary, ethmoid and sphenoid sinuses are clear.                        Preliminary result by Devonte Lezama MD (09/07/24 01:38:35)                   Impression:    1. There is a small contusion in the mid frontal scalp. The underlying skull is intact without fracture.  2. No acute intracranial traumatic injury identified. Details and other findings as noted above.               Narrative:    START OF REPORT:  Technique: CT of  the head was performed without intravenous contrast with axial as well as coronal and sagittal images.    Comparison: None.    Dosage Information: Automated Exposure Control was utilized 1502.20 mGy.cm.    Clinical history: Pt had a slip and fall at home, -thinners, +LOC, GCS 15 on arrival. Recent surgery to face, healed incisions to forehead, also has a new laceration medial forehead with bleeding controlled.    Findings:  Hemorrhage: No acute intracranial hemorrhage is seen.  CSF spaces: The ventricles, sulci and basal cisterns all appear prominent consistent with global cerebral atrophy.  Brain parenchyma: Unremarkable with preservation of the grey white junction throughout.  Cerebellum: Unremarkable.  Vascular: Unremarkable venous sinuses. Mild scattered atheromatous calcification of the intracranial arteries is seen.  Sella and skull base: The sella appears to be within normal limits for age.  Cerebellopontine angles: Within normal limits.  Herniation: None.  Intracranial calcifications: Incidental note is made of bilateral choroid plexus calcification. Incidental note is made of some pineal region calcification. Incidental note is made of some calcification of the falx. Incidental note is made of subtle left basal ganglia calcifcation.  Calvarium: No acute linear or depressed skull fracture is seen.  Scalp: There is a small contusion in the mid frontal scalp. The underlying skull is intact without fracture.    Maxillofacial Structures: Maxillofacial findings discussed separately in the maxillofacial CT report.                                         X-Ray Pelvis Routine AP (Final result)  Result time 09/07/24 10:14:39      Final result by Kulwinder Vieyra MD (09/07/24 10:14:39)                   Impression:      Degenerative changes with no displaced fracture appreciated.      Electronically signed by: Kulwinder Vieyra  Date:    09/07/2024  Time:    10:14               Narrative:    EXAMINATION:  XR PELVIS  ROUTINE AP    CLINICAL HISTORY:  fall;    TECHNIQUE:  Single view of the pelvis.    COMPARISON:  No prior imaging available for comparison    FINDINGS:  The sacroiliac joints are symmetric.  The pubic symphysis is congruent.  The visualized femoral necks are intact.                                       X-Ray Chest AP Portable (Final result)  Result time 09/07/24 08:23:30      Final result by Kulwinder Vieyra MD (09/07/24 08:23:30)                   Impression:      No acute cardiopulmonary process.      Electronically signed by: Kulwinder Vieyra  Date:    09/07/2024  Time:    08:23               Narrative:    EXAMINATION:  XR CHEST AP PORTABLE    CLINICAL HISTORY:  Unspecified fall, initial encounter    TECHNIQUE:  Single view of the chest    COMPARISON:  07/23/2024    FINDINGS:  No focal opacification, pleural effusion, or pneumothorax.    The cardiomediastinal silhouette is within normal limits.    No acute osseous abnormality.                                       Medications - No data to display  Medical Decision Making  Problems Addressed:  Closed head injury, initial encounter: acute illness or injury  Fall: acute illness or injury  Laceration of forehead, initial encounter: acute illness or injury    Amount and/or Complexity of Data Reviewed  Radiology: ordered. Decision-making details documented in ED Course.    Differential diagnosis (includes but is not limited to):   ICH, skull fracture, TBI, spinal injury, laceration, contusion    MDM Narrative  84-year-old male presents for evaluation of face and head pain after a trip and fall at home.  He does report momentary loss of consciousness after the fall.  CT scans performed and are negative for acute traumatic injuries.  X-rays reviewed with no acute injuries identified.  Laceration repaired, see procedure note.  Patient ambulatory at his baseline with a steady gait.  Patient tolerating oral intake.  Patient states he was ready for discharge home.  Strict  return precautions discussed with the patient and his wife, both have verbalized understanding.  Need for follow up with his PCP discussed and patient verbalized understanding.    Dispo: Discharge    My independent radiology interpretation: as above  Point of care US (independently performed and interpreted):   Decision rules/clinical scoring:     Sepsis Perfusion Assessment:     Amount and/or Complexity of Data Reviewed  Independent historian: spouse    Summary of history: as above  External data reviewed: notes from previous ED visits and notes from clinic visits  Summary of data reviewed: Prior records reviewed  Risk and benefits of testing: discussed   Radiology: ordered and independent interpretation performed (see above or ED course)  ECG/medicine tests: ordered and independent interpretation performed (see above or ED course)  Discussion of management or test interpretation with external provider(s): none   Summary of discussion:     Risk  Prescription drug management   Decision regarding hospitalization  Shared decision making     Critical Care  none    Data Reviewed/Counseling: I have personally reviewed the patient's vital signs, nursing notes, and other relevant tests, information, and imaging. I had a detailed discussion regarding the historical points, exam findings, and any diagnostic results supporting the discharge diagnosis. I personally performed the history, PE, MDM and procedures as documented above and agree with the scribe's documentation.    Portions of this note were dictated using voice recognition software. Although it was reviewed for accuracy, some inherent voice recognition errors may have occurred and may be present in this document.         Scribe Attestation:   Scribe #1: I performed the above scribed service and the documentation accurately describes the services I performed. I attest to the accuracy of the note.    Attending Attestation:           Physician Attestation for  Scribe:  Physician Attestation Statement for Scribe #1: I, Elton Lynn MD, reviewed documentation, as scribed by Nicki Singh in my presence, and it is both accurate and complete.             ED Course as of 09/23/24 0838   Sat Sep 07, 2024   0049 EKG independently interpreted by me.  EKG: NSR @ 69, LVH, no STEMI, Qtc 422 []   0246 X-Ray Chest AP Portable  Independently visualized/reviewed by me during the ED visit.  - No displaced rib fracture or PTX []   0246 X-Ray Pelvis Routine AP  Independently visualized/reviewed by me during the ED visit.  - No acute fracture or dislocation []   0313 Lac repaired at bedside with steri-strips and skin adhesive, see procedure note. []   0338 I have reassessed the patient.  Patient is resting comfortably, no acute distress.  Vital signs stable.  Discussed all results including incidental findings.  Discussed need for follow up and discussed return precautions.  Answered all questions at this time.  Hemodynamically stable for continued outpatient management. Patient verbalized understanding and agreed to plan.    [MC]      ED Course User Index  [] Elton Lynn MD                           Clinical Impression:  Final diagnoses:  [W19.XXXA] Fall  [S09.90XA] Closed head injury, initial encounter (Primary)  [S01.81XA] Laceration of forehead, initial encounter          ED Disposition Condition    Discharge Stable          ED Prescriptions    None       Follow-up Information       Follow up With Specialties Details Why Contact Info    Murphy Canales MD Internal Medicine Schedule an appointment as soon as possible for a visit   [address] [facility] [address]  [phone]      [facility] - Emergency Dept Emergency Medicine  If symptoms worsen [address] [facility] [id number] [phone]             Elton Lynn MD  09/23/24 [id number]

## 2024-09-07 NOTE — DISCHARGE INSTRUCTIONS

## 2024-09-19 ENCOUNTER — HOSPITAL ENCOUNTER (OUTPATIENT)
Dept: WOUND CARE | Facility: HOSPITAL | Age: 84
Discharge: HOME | End: 2024-09-19
Attending: EMERGENCY MEDICINE
Payer: MEDICARE

## 2024-09-19 VITALS
RESPIRATION RATE: 16 BRPM | BODY MASS INDEX: 22.07 KG/M2 | WEIGHT: 149 LBS | HEART RATE: 60 BPM | DIASTOLIC BLOOD PRESSURE: 75 MMHG | HEIGHT: 69 IN | TEMPERATURE: 98 F | SYSTOLIC BLOOD PRESSURE: 151 MMHG

## 2024-09-19 DIAGNOSIS — N40.0 BENIGN PROSTATIC HYPERPLASIA WITHOUT LOWER URINARY TRACT SYMPTOMS: ICD-10-CM

## 2024-09-19 DIAGNOSIS — I10 ESSENTIAL HYPERTENSION, BENIGN: ICD-10-CM

## 2024-09-19 DIAGNOSIS — M17.9 OSTEOARTHRITIS OF KNEE, UNSPECIFIED LATERALITY, UNSPECIFIED OSTEOARTHRITIS TYPE: ICD-10-CM

## 2024-09-19 DIAGNOSIS — S41.112A SKIN TEAR OF LEFT UPPER EXTREMITY: Primary | ICD-10-CM

## 2024-09-19 PROCEDURE — 27000999 HC MEDICAL RECORD PHOTO DOCUMENTATION

## 2024-09-19 PROCEDURE — 99212 OFFICE O/P EST SF 10 MIN: CPT | Mod: ,,, | Performed by: EMERGENCY MEDICINE

## 2024-09-19 NOTE — PATIENT INSTRUCTIONS
Pt seen today by: Dr. Mellisa Campbell    Supplies ordered on 9/19/2024 from [facility]    DRESSING INSTRUCTIONS:    Wound location: Left Posterior and Left Anterior Forearm    Dressings to be changed daily and as needed if soiled or not intact.  Patient and/or family may be asked to assist on other days.      Cleanse wound with wound cleanser or baby shampoo  Apply telfa to any open wound  Cover with gauze then wrap with kerlix and secure with tape       Compression with: N/A    Return visit: Thursday,Oct 3rd 2024, at 11:00 am.       Wound may have been debrided in clinic: if so, WHAT YOU NEED TO KNOW:    Debridement is the removal of infected, damaged, or dead tissue so a wound can heal properly. Your wound may need more than one debridement. Debridement can cause bleeding, and a small amount of blood is expected.  AFTER A DEBRIDEMENT:    Keep your wound clean and dry. Do not remove the dressing unless instructed.  Follow the wound care orders provided to you or your home health care provider.  If you have pain, take over the counter pain relievers or pain medication if prescribed.  Elevate the wound and limit excessive activity to prevent bleeding and/or swelling in your wound.  If you see blood coming through the dressing, apply gauze and tape over the dressing and hold firm pressure to the wound with your hand for 5-10 minutes continuously, without peeking, to help the bleeding stop.  Contact [facility] wound care team at [phone] or go to the nearest Emergency department if:    You have a fever greater than 101 taken by mouth.  Your pain gets worse or does not go away, even after taking your regular pain medicine.  Your skin around your wound is red, hot, swollen, or draining pus.  You have bleeding that continues to come through the dressing after holding pressure for 10 minutes       Compression: You may be using a compressive type of dressings to control edema: If so, keep your compression wrap  or tubigrip in place. Do not get them wet, they should feel snug. If they feel tight, or cause pain in any way,  elevate your legs above your heart for 15 minutes. If the wraps still cause pain or you can not tolerate compression,  please remove and notify the clinic or your home health.     Nutrition:  The current daily value (%DV) for protein is 50 grams per day and is meant as a general goal for most people. Further increasing your dietary protein intake is very important for wound healing. Typically one needs over 100g of protein per day to help with wound healing needs.  If you are a dialysis patient or have problems with your kidneys, talk to your Nephrologist about how much protein you can take in with your condition.  Examples of high protein items that can be added to your diet include: eggs, chicken, red meats, almonds, cottage cheese, Greek yogurt, beans, and peanut butter.  Fortified protein bars, shakes and drinks can add 15-30 additional grams of protein per serving.   Also add:   1 daily general multivitamin   Bhaskar : 1 packet twice daily   Vitamin C : 500mg twice daily   Zinc 220 mg daily  Vit D : once daily    Offloading   Offload your wound. This means to reduce pressure on and around the wound that reduces blood flow to the wound and prevents healing. Your wound care team will discuss specific ways for you to offload your specific wound. Common offloading strategies include:    Turn or reposition every 2 hours or sooner  Use pillows, wedges, ROHO wheelchair cushions or other special devices like boots and shoes to lift the wound off of hard surfaces  Alternating Low Air-loss (ALAL) mattress may be ordered  Padded dressings can reduce wound pressure        Call our [facility] wound clinic for questions/concerns a [phone] .

## 2024-09-19 NOTE — PROGRESS NOTES
"  Outpatient Wound Care and Hyperbaric Clinic      Subjective:       Patient ID: Aleisha Hernandez is a 84 y.o. male.    Chief Complaint: Wound Check    CC: LUPINO skin tears    84-year-old white male fell during the night getting back into his bed when his knee gave out in the early morning hours of August 14th.  He followed up that same morning with his PCP Dr. Canales who referred him to this clinic.  I met him on 08/16/24.  Patient was on Ultram per PCP for the pain from the wounds.  I noted several skin avulsions to LUE with rolled epidermal skin that has rolled under itself and become nonviable.  All the nonviable areas had to be removed with scissors and pickups.  Entire upper extremity was washed and cleaned and he was advised of topical antimicrobial ointment and adaptic and to keep covered.  Patient came in sooner than scheduled on 8/20/24 as they were having a lot of trouble removing the dressings and patient was intolerant and in pain.  We changed the dressing and adjusted it to avoid any sticking and are drying out. (Mepitel) .  Wounds have been improving since then.  He comes in today for recheck on 9/19/2 4.  I note Steri-Strips to his forehead and he states that he had a fall 10 days ago that required Dermabond and Steri-Strips.      Review of Systems   Constitutional: Negative.    Respiratory: Negative.     Cardiovascular: Negative.    Gastrointestinal: Negative.    Skin:  Positive for wound.   Neurological: Negative.          Objective:      Vitals:    09/19/24 1046   BP: (!) 151/75   Pulse: 60   Resp: 16   Temp: 97.7 °F (36.5 °C)           @poctglucose@  No results for input(s): "POCTGLUCOSE" in the last 24 hours.  Physical Exam  Vitals reviewed.   Constitutional:       Appearance: He is not ill-appearing.   Pulmonary:      Effort: Pulmonary effort is normal.   Musculoskeletal:         General: Normal range of motion.        Arms:    Skin:     Capillary Refill: Capillary refill takes less than 2 " seconds.   Neurological:      General: No focal deficit present.      Mental Status: He is alert and oriented to person, place, and time. Mental status is at baseline.            Wound 08/16/24 0828 Traumatic Left posterior;lower Arm #1 (Active)   08/16/24 0828   Present on Original Admission: Y   Primary Wound Type: Traumatic   Side: Left   Orientation: posterior;lower   Location: Arm   Wound Approximate Age at First Assessment (Weeks):    Wound Number: #1   Is this injury device related?:    Incision Type:    Closure Method:    Wound Description (Comments):    Type:    Additional Comments:    Ankle-Brachial Index:    Pulses:    Removal Indication and Assessment:    Wound Outcome:    Wound Image    09/19/24 1054   Dressing Appearance Intact;Moist drainage 09/19/24 1054   Drainage Amount Small 09/19/24 1054   Drainage Characteristics/Odor Yellow 09/19/24 1054   Appearance Pink;Eschar;Epithelialization 09/19/24 1054   Tissue loss description Full thickness 09/19/24 1054   Black (%), Wound Tissue Color 0 % 09/19/24 1054   Red (%), Wound Tissue Color 100 % 09/19/24 1054   Yellow (%), Wound Tissue Color 0 % 09/19/24 1054   Periwound Area Intact 09/19/24 1054   Wound Edges Irregular 09/19/24 1054   Wound Length (cm) 2.5 cm 09/19/24 1054   Wound Width (cm) 2.5 cm 09/19/24 1054   Wound Depth (cm) 0.1 cm 09/19/24 1054   Wound Volume (cm^3) 0.625 cm^3 09/19/24 1054   Wound Surface Area (cm^2) 6.25 cm^2 09/19/24 1054   Care Cleansed with:;Antimicrobial agent;Applied: 09/19/24 1054   Dressing Applied;Non-adherent;Rolled gauze 09/19/24 1054            Wound 08/16/24 0830 Traumatic Left lower;anterior Arm #2 (Active)   08/16/24 0830   Present on Original Admission: Y   Primary Wound Type: Traumatic   Side: Left   Orientation: lower;anterior   Location: Arm   Wound Approximate Age at First Assessment (Weeks):    Wound Number: #2   Is this injury device related?:    Incision Type:    Closure Method:    Wound Description  (Comments):    Type:    Additional Comments:    Ankle-Brachial Index:    Pulses:    Removal Indication and Assessment:    Wound Outcome:    Wound Image    09/19/24 1055   Dressing Appearance Intact;Moist drainage 09/19/24 1055   Drainage Amount Small 09/19/24 1055   Drainage Characteristics/Odor Yellow 09/19/24 1055   Appearance Pink;Eschar;Epithelialization 09/19/24 1055   Tissue loss description Full thickness 09/19/24 1055   Black (%), Wound Tissue Color 0 % 09/19/24 1055   Red (%), Wound Tissue Color 100 % 09/19/24 1055   Yellow (%), Wound Tissue Color 0 % 09/19/24 1055   Periwound Area Intact 09/19/24 1055   Wound Edges Irregular 09/19/24 1055   Wound Length (cm) 4.5 cm 09/19/24 1055   Wound Width (cm) 3 cm 09/19/24 1055   Wound Depth (cm) 0.1 cm 09/19/24 1055   Wound Volume (cm^3) 1.35 cm^3 09/19/24 1055   Wound Surface Area (cm^2) 13.5 cm^2 09/19/24 1055   Care Cleansed with:;Antimicrobial agent;Applied: 09/19/24 1055   Dressing Applied;Non-adherent;Rolled gauze 09/19/24 1055               Assessment:       1. Skin tear of left upper extremity    2. Benign prostatic hyperplasia without lower urinary tract symptoms    3. Essential hypertension, benign    4. Osteoarthritis of knee, unspecified laterality, unspecified osteoarthritis type          Left upper extremity skin tears s/p fall on 8/14/24:  1st clinic visit 8/16/24:          Plan:     Plan of Care:    The left upper extremity skin tears/skin avulsions all  improving/healing  We cleaned and scrubbed the dry flaky coverings  We can now just use a Telfa and keep it covered.  No ointment  I removed the Steri-Strips from the forehead and the wound has healed underneath  Return to clinic 2 weeks per their request, instead of next week        The time spent including preparing to see the patient, obtaining patient history and assessment, evaluation of the plan of care, patient/caregiver counseling and education, orders, documentation, coordination of care, and  other professional medical management activities for today's encounter was 15 minutes.

## 2024-09-20 ENCOUNTER — PATIENT MESSAGE (OUTPATIENT)
Dept: WOUND CARE | Facility: HOSPITAL | Age: 84
End: 2024-09-20
Payer: MEDICARE

## 2024-09-20 RX ORDER — MUPIROCIN 20 MG/G
OINTMENT TOPICAL DAILY
Qty: 22 G | Refills: 1 | Status: SHIPPED | OUTPATIENT
Start: 2024-09-20 | End: 2024-10-20

## 2024-10-03 ENCOUNTER — HOSPITAL ENCOUNTER (OUTPATIENT)
Dept: WOUND CARE | Facility: HOSPITAL | Age: 84
Discharge: HOME | End: 2024-10-03
Attending: STUDENT IN AN ORGANIZED HEALTH CARE EDUCATION/TRAINING PROGRAM
Payer: MEDICARE

## 2024-10-03 VITALS — DIASTOLIC BLOOD PRESSURE: 66 MMHG | SYSTOLIC BLOOD PRESSURE: 148 MMHG | HEART RATE: 52 BPM | TEMPERATURE: 98 F

## 2024-10-03 DIAGNOSIS — S41.112A SKIN TEAR OF LEFT UPPER EXTREMITY: Primary | ICD-10-CM

## 2024-10-03 PROCEDURE — 99212 OFFICE O/P EST SF 10 MIN: CPT | Mod: ,,, | Performed by: EMERGENCY MEDICINE

## 2024-10-03 PROCEDURE — 99212 OFFICE O/P EST SF 10 MIN: CPT

## 2024-10-03 PROCEDURE — 27000999 HC MEDICAL RECORD PHOTO DOCUMENTATION

## 2024-10-03 NOTE — PROGRESS NOTES
"  Outpatient Wound Care and Hyperbaric Clinic      Subjective:       Patient ID: Aleisha Hernandez is a 84 y.o. male.    Chief Complaint: Wound Check    CC: DARRELL skin tears    84-year-old white male fell during the night getting back into his bed when his knee gave out in the early morning hours of August 14th.  He followed up that same morning with his PCP Dr. Canales who referred him to this clinic.  I met him on 08/16/24.  Patient was on Ultram per PCP for the pain from the wounds.  I noted several skin avulsions to SHANTAE with rolled epidermal skin that has rolled under itself and become nonviable.  All the nonviable areas had to be removed with scissors and pickups.  Entire upper extremity was washed and cleaned and he was advised of topical antimicrobial ointment and adaptic and to keep covered.  Patient came in sooner than scheduled on 8/20/24 as they were having a lot of trouble removing the dressings and patient was intolerant and in pain.  We changed the dressing and adjusted it to avoid any sticking /drying out. (Mepitel) .    I have continued to follow.  He comes in today for recheck on October 3rd and it looks like everything has healed.        Review of Systems   Constitutional: Negative.    Respiratory: Negative.     Cardiovascular: Negative.    Gastrointestinal: Negative.    Skin:  Positive for wound.   Neurological: Negative.          Objective:      Vitals:    10/03/24 1106   BP: (!) 148/66   Pulse: (!) 52   Temp: 97.6 °F (36.4 °C)             @poctglucose@  No results for input(s): "POCTGLUCOSE" in the last 24 hours.  Physical Exam  Vitals reviewed.   Constitutional:       Appearance: He is not ill-appearing.   Pulmonary:      Effort: Pulmonary effort is normal.   Musculoskeletal:         General: Normal range of motion.        Arms:    Skin:     Capillary Refill: Capillary refill takes less than 2 seconds.   Neurological:      General: No focal deficit present.      Mental Status: He is alert and " oriented to person, place, and time. Mental status is at baseline.              Wound 08/16/24 0828 Traumatic Left posterior;lower Arm #1 (Active)   08/16/24 0828   Present on Original Admission: Y   Primary Wound Type: Traumatic   Side: Left   Orientation: posterior;lower   Location: Arm   Wound Approximate Age at First Assessment (Weeks):    Wound Number: #1   Is this injury device related?:    Incision Type:    Closure Method:    Wound Description (Comments):    Type:    Additional Comments:    Ankle-Brachial Index:    Pulses:    Removal Indication and Assessment:    Wound Outcome:    Wound Image   10/03/24 1119   Dressing Appearance Intact;Dry 10/03/24 1119   Drainage Amount None 10/03/24 1119   Appearance Epithelialization 10/03/24 1119   Tissue loss description Not applicable 10/03/24 1119   Black (%), Wound Tissue Color 0 % 10/03/24 1119   Red (%), Wound Tissue Color 100 % 10/03/24 1119   Yellow (%), Wound Tissue Color 0 % 10/03/24 1119   Periwound Area Intact 10/03/24 1119   Wound Length (cm) 0 cm 10/03/24 1119   Wound Width (cm) 0 cm 10/03/24 1119   Wound Depth (cm) 0 cm 10/03/24 1119   Wound Volume (cm^3) 0 cm^3 10/03/24 1119   Wound Surface Area (cm^2) 0 cm^2 10/03/24 1119   Care Cleansed with:;Antimicrobial agent 10/03/24 1119   Dressing Applied 10/03/24 1123            Wound 08/16/24 0830 Traumatic Left lower;anterior Arm #2 (Active)   08/16/24 0830   Present on Original Admission: Y   Primary Wound Type: Traumatic   Side: Left   Orientation: lower;anterior   Location: Arm   Wound Approximate Age at First Assessment (Weeks):    Wound Number: #2   Is this injury device related?:    Incision Type:    Closure Method:    Wound Description (Comments):    Type:    Additional Comments:    Ankle-Brachial Index:    Pulses:    Removal Indication and Assessment:    Wound Outcome:    Wound Image   10/03/24 1119   Dressing Appearance Dry;Intact 10/03/24 1119   Appearance Epithelialization 10/03/24 1119   Tissue loss  description Not applicable 10/03/24 1119   Wound Length (cm) 0 cm 10/03/24 1119   Wound Width (cm) 0 cm 10/03/24 1119   Wound Depth (cm) 0 cm 10/03/24 1119   Wound Volume (cm^3) 0 cm^3 10/03/24 1119   Wound Surface Area (cm^2) 0 cm^2 10/03/24 1119   Care Cleansed with:;Antimicrobial agent 10/03/24 1119   Dressing Applied 10/03/24 1123                 Assessment:       1. Skin tear of left upper extremity          Left upper extremity skin tears s/p fall on 8/14/24:  1st clinic visit 8/16/24:  Healed 10/3/2 4          Plan:     Plan of Care:    The left upper extremity skin tears/skin avulsions have finally healed.  I would recommend that he moisturize his skin.  He can keep protected as needed    Discharge from clinic, return as needed  The time spent including preparing to see the patient, obtaining patient history and assessment, evaluation of the plan of care, patient/caregiver counseling and education, orders, documentation, coordination of care, and other professional medical management activities for today's encounter was 15 minutes.   [de-identified] : Irregularly irregular.  No gallop or rub.  Midsystolic murmur.

## 2024-10-03 NOTE — PATIENT INSTRUCTIONS
Pt seen today by: Dr. Mellisa Campbell    [facility]    DRESSING INSTRUCTIONS:    Wound location: Left Posterior and Left Anterior Forearm      Cleanse with soap and water  Pat dry  Apply remedy  Ok to cover if you are going somewhere or need protection       Return visit: No return visit - call us at [phone] if you need to return      Wound may have been debrided in clinic: if so, WHAT YOU NEED TO KNOW:    Debridement is the removal of infected, damaged, or dead tissue so a wound can heal properly. Your wound may need more than one debridement. Debridement can cause bleeding, and a small amount of blood is expected.  AFTER A DEBRIDEMENT:    Keep your wound clean and dry. Do not remove the dressing unless instructed.  Follow the wound care orders provided to you or your home health care provider.  If you have pain, take over the counter pain relievers or pain medication if prescribed.  Elevate the wound and limit excessive activity to prevent bleeding and/or swelling in your wound.  If you see blood coming through the dressing, apply gauze and tape over the dressing and hold firm pressure to the wound with your hand for 5-10 minutes continuously, without peeking, to help the bleeding stop.  Contact [facility] wound care team at [phone] or go to the nearest Emergency department if:    You have a fever greater than 101 taken by mouth.  Your pain gets worse or does not go away, even after taking your regular pain medicine.  Your skin around your wound is red, hot, swollen, or draining pus.  You have bleeding that continues to come through the dressing after holding pressure for 10 minutes       Compression: You may be using a compressive type of dressings to control edema: If so, keep your compression wrap or tubigrip in place. Do not get them wet, they should feel snug. If they feel tight, or cause pain in any way,  elevate your legs above your heart for 15 minutes. If  the wraps still cause pain or you can not tolerate compression,  please remove and notify the clinic or your home health.     Nutrition:  The current daily value (%DV) for protein is 50 grams per day and is meant as a general goal for most people. Further increasing your dietary protein intake is very important for wound healing. Typically one needs over 100g of protein per day to help with wound healing needs.  If you are a dialysis patient or have problems with your kidneys, talk to your Nephrologist about how much protein you can take in with your condition.  Examples of high protein items that can be added to your diet include: eggs, chicken, red meats, almonds, cottage cheese, Greek yogurt, beans, and peanut butter.  Fortified protein bars, shakes and drinks can add 15-30 additional grams of protein per serving.   Also add:   1 daily general multivitamin   Bhaskar : 1 packet twice daily   Vitamin C : 500mg twice daily   Zinc 220 mg daily  Vit D : once daily    Offloading   Offload your wound. This means to reduce pressure on and around the wound that reduces blood flow to the wound and prevents healing. Your wound care team will discuss specific ways for you to offload your specific wound. Common offloading strategies include:    Turn or reposition every 2 hours or sooner  Use pillows, wedges, ROHO wheelchair cushions or other special devices like boots and shoes to lift the wound off of hard surfaces  Alternating Low Air-loss (ALAL) mattress may be ordered  Padded dressings can reduce wound pressure        Call our [facility] wound clinic for questions/concerns a [phone] .

## 2024-10-08 DIAGNOSIS — G47.62 NOCTURNAL LEG CRAMPS: ICD-10-CM

## 2024-10-08 DIAGNOSIS — G47.00 INSOMNIA, UNSPECIFIED TYPE: Primary | ICD-10-CM

## 2024-10-08 RX ORDER — ZOLPIDEM TARTRATE 10 MG/1
10 TABLET ORAL NIGHTLY PRN
Qty: 30 TABLET | Refills: 2 | Status: SHIPPED | OUTPATIENT
Start: 2024-10-08

## 2024-10-08 RX ORDER — DILTIAZEM HYDROCHLORIDE 30 MG/1
30 TABLET, FILM COATED ORAL NIGHTLY
Qty: 30 TABLET | Refills: 0 | OUTPATIENT
Start: 2024-10-08

## 2024-10-08 NOTE — TELEPHONE ENCOUNTER
----- Message from Nurse Mckeon sent at 10/7/2024 11:51 AM CDT -----  Regarding: FW: med  Last appt: 8/14/24  Upcoming appt: 11/1/24  Last filleD: discontinued on 5/11/24 - not on current med list  ----- Message -----  From: Evon Smith  Sent: 10/7/2024   8:59 AM CDT  To: Timmy [facility] Staff  Subject: med                                              .Type:  RX Refill Request    Who Called: pt  Refill or New Rx:refill  RX Name and Strength:  How is the patient currently taking it? (ex. 1XDay):  Is this a 30 day or 90 day RX:  Preferred Pharmacy with phone number:  Local or Mail Order:local  Ordering Provider:timmy  Would the patient rather a call back or a response via [facility]?   Best Call Back Number: [phone]  Additional Information: request med refill unable to locate in chart - Zoldidem 10mg

## 2024-11-08 ENCOUNTER — TELEPHONE (OUTPATIENT)
Dept: PRIMARY CARE CLINIC | Facility: CLINIC | Age: 84
End: 2024-11-08
Payer: MEDICARE

## 2024-11-08 DIAGNOSIS — D64.9 MILD ANEMIA: Primary | ICD-10-CM

## 2024-11-08 DIAGNOSIS — R73.09 ABNORMAL GLUCOSE LEVEL: ICD-10-CM

## 2024-11-08 DIAGNOSIS — Z00.00 WELL ADULT EXAM: ICD-10-CM

## 2024-11-08 DIAGNOSIS — G47.62 NOCTURNAL LEG CRAMPS: Primary | ICD-10-CM

## 2024-11-08 RX ORDER — DILTIAZEM HYDROCHLORIDE 30 MG/1
30 TABLET, FILM COATED ORAL NIGHTLY PRN
Qty: 90 TABLET | Refills: 3 | Status: SHIPPED | OUTPATIENT
Start: 2024-11-08 | End: 2025-11-08

## 2024-11-19 ENCOUNTER — LAB VISIT (OUTPATIENT)
Dept: LAB | Facility: HOSPITAL | Age: 84
End: 2024-11-19
Attending: STUDENT IN AN ORGANIZED HEALTH CARE EDUCATION/TRAINING PROGRAM
Payer: MEDICARE

## 2024-11-19 DIAGNOSIS — D64.9 MILD ANEMIA: ICD-10-CM

## 2024-11-19 DIAGNOSIS — Z00.00 WELL ADULT EXAM: ICD-10-CM

## 2024-11-19 DIAGNOSIS — R73.09 ABNORMAL GLUCOSE LEVEL: ICD-10-CM

## 2024-11-19 LAB
ALBUMIN SERPL-MCNC: 3.4 G/DL (ref 3.4–4.8)
ALBUMIN/GLOB SERPL: 1.5 RATIO (ref 1.1–2)
ALP SERPL-CCNC: 64 UNIT/L (ref 40–150)
ALT SERPL-CCNC: 16 UNIT/L (ref 0–55)
ANION GAP SERPL CALC-SCNC: 8 MEQ/L
AST SERPL-CCNC: 21 UNIT/L (ref 5–34)
BACTERIA #/AREA URNS AUTO: ABNORMAL /HPF
BASOPHILS # BLD AUTO: 0.06 X10(3)/MCL
BASOPHILS NFR BLD AUTO: 1 %
BILIRUB SERPL-MCNC: 0.4 MG/DL
BILIRUB UR QL STRIP.AUTO: NEGATIVE
BUN SERPL-MCNC: 11.9 MG/DL (ref 8.4–25.7)
CALCIUM SERPL-MCNC: 8.6 MG/DL (ref 8.8–10)
CHLORIDE SERPL-SCNC: 108 MMOL/L (ref 98–107)
CHOLEST SERPL-MCNC: 220 MG/DL
CHOLEST/HDLC SERPL: 4 {RATIO} (ref 0–5)
CLARITY UR: CLEAR
CO2 SERPL-SCNC: 26 MMOL/L (ref 23–31)
COLOR UR AUTO: ABNORMAL
CREAT SERPL-MCNC: 0.82 MG/DL (ref 0.72–1.25)
CREAT/UREA NIT SERPL: 15
EOSINOPHIL # BLD AUTO: 0.07 X10(3)/MCL (ref 0–0.9)
EOSINOPHIL NFR BLD AUTO: 1.1 %
ERYTHROCYTE [DISTWIDTH] IN BLOOD BY AUTOMATED COUNT: 14.5 % (ref 11.5–17)
EST. AVERAGE GLUCOSE BLD GHB EST-MCNC: 108.3 MG/DL
GFR SERPLBLD CREATININE-BSD FMLA CKD-EPI: >60 ML/MIN/1.73/M2
GLOBULIN SER-MCNC: 2.2 GM/DL (ref 2.4–3.5)
GLUCOSE SERPL-MCNC: 94 MG/DL (ref 82–115)
GLUCOSE UR QL STRIP: NORMAL
HBA1C MFR BLD: 5.4 %
HCT VFR BLD AUTO: 39.4 % (ref 42–52)
HDLC SERPL-MCNC: 56 MG/DL (ref 35–60)
HGB BLD-MCNC: 12.4 G/DL (ref 14–18)
HGB UR QL STRIP: NEGATIVE
IMM GRANULOCYTES # BLD AUTO: 0.03 X10(3)/MCL (ref 0–0.04)
IMM GRANULOCYTES NFR BLD AUTO: 0.5 %
KETONES UR QL STRIP: NEGATIVE
LDLC SERPL CALC-MCNC: 150 MG/DL (ref 50–140)
LEUKOCYTE ESTERASE UR QL STRIP: NEGATIVE
LYMPHOCYTES # BLD AUTO: 1.95 X10(3)/MCL (ref 0.6–4.6)
LYMPHOCYTES NFR BLD AUTO: 31 %
MCH RBC QN AUTO: 29.6 PG (ref 27–31)
MCHC RBC AUTO-ENTMCNC: 31.5 G/DL (ref 33–36)
MCV RBC AUTO: 94 FL (ref 80–94)
MONOCYTES # BLD AUTO: 0.61 X10(3)/MCL (ref 0.1–1.3)
MONOCYTES NFR BLD AUTO: 9.7 %
MUCOUS THREADS URNS QL MICRO: ABNORMAL /LPF
NEUTROPHILS # BLD AUTO: 3.58 X10(3)/MCL (ref 2.1–9.2)
NEUTROPHILS NFR BLD AUTO: 56.7 %
NITRITE UR QL STRIP: NEGATIVE
NRBC BLD AUTO-RTO: 0 %
PH UR STRIP: 6.5 [PH]
PLATELET # BLD AUTO: 330 X10(3)/MCL (ref 130–400)
PMV BLD AUTO: 12.2 FL (ref 7.4–10.4)
POTASSIUM SERPL-SCNC: 4.5 MMOL/L (ref 3.5–5.1)
PROT SERPL-MCNC: 5.6 GM/DL (ref 5.8–7.6)
PROT UR QL STRIP: NEGATIVE
RBC # BLD AUTO: 4.19 X10(6)/MCL (ref 4.7–6.1)
RBC #/AREA URNS AUTO: ABNORMAL /HPF
SODIUM SERPL-SCNC: 142 MMOL/L (ref 136–145)
SP GR UR STRIP.AUTO: 1.02 (ref 1–1.03)
SQUAMOUS #/AREA URNS LPF: ABNORMAL /HPF
TRIGL SERPL-MCNC: 71 MG/DL (ref 34–140)
TSH SERPL-ACNC: 1.87 UIU/ML (ref 0.35–4.94)
UROBILINOGEN UR STRIP-ACNC: NORMAL
VLDLC SERPL CALC-MCNC: 14 MG/DL
WBC # BLD AUTO: 6.3 X10(3)/MCL (ref 4.5–11.5)
WBC #/AREA URNS AUTO: ABNORMAL /HPF

## 2024-11-19 PROCEDURE — 83036 HEMOGLOBIN GLYCOSYLATED A1C: CPT

## 2024-11-19 PROCEDURE — 36415 COLL VENOUS BLD VENIPUNCTURE: CPT

## 2024-11-19 PROCEDURE — 81001 URINALYSIS AUTO W/SCOPE: CPT

## 2024-11-19 PROCEDURE — 80061 LIPID PANEL: CPT

## 2024-11-19 PROCEDURE — 84443 ASSAY THYROID STIM HORMONE: CPT

## 2024-11-19 PROCEDURE — 85025 COMPLETE CBC W/AUTO DIFF WBC: CPT

## 2024-11-19 PROCEDURE — 80053 COMPREHEN METABOLIC PANEL: CPT

## 2024-11-20 RX ORDER — METHOCARBAMOL 500 MG/1
500 TABLET, FILM COATED ORAL 3 TIMES DAILY
COMMUNITY
Start: 2024-10-10 | End: 2024-11-21

## 2024-11-20 RX ORDER — MELOXICAM 15 MG/1
15 TABLET ORAL
COMMUNITY
Start: 2024-10-10 | End: 2024-11-21

## 2024-11-21 ENCOUNTER — OFFICE VISIT (OUTPATIENT)
Dept: PRIMARY CARE CLINIC | Facility: CLINIC | Age: 84
End: 2024-11-21
Payer: MEDICARE

## 2024-11-21 VITALS
WEIGHT: 160 LBS | SYSTOLIC BLOOD PRESSURE: 149 MMHG | OXYGEN SATURATION: 99 % | RESPIRATION RATE: 15 BRPM | BODY MASS INDEX: 23.7 KG/M2 | HEIGHT: 69 IN | TEMPERATURE: 98 F | DIASTOLIC BLOOD PRESSURE: 81 MMHG | HEART RATE: 61 BPM

## 2024-11-21 DIAGNOSIS — I10 ESSENTIAL HYPERTENSION, BENIGN: ICD-10-CM

## 2024-11-21 DIAGNOSIS — G25.0 ESSENTIAL TREMOR: ICD-10-CM

## 2024-11-21 DIAGNOSIS — D64.9 MILD ANEMIA: ICD-10-CM

## 2024-11-21 DIAGNOSIS — Z00.00 MEDICARE ANNUAL WELLNESS VISIT, SUBSEQUENT: Primary | ICD-10-CM

## 2024-11-21 PROBLEM — Z01.818 PRE-OP EXAMINATION: Status: RESOLVED | Noted: 2024-02-28 | Resolved: 2024-11-21

## 2024-11-21 RX ORDER — PROPRANOLOL HYDROCHLORIDE 60 MG/1
60 CAPSULE, EXTENDED RELEASE ORAL DAILY
Qty: 30 CAPSULE | Refills: 11 | Status: SHIPPED | OUTPATIENT
Start: 2024-11-21 | End: 2025-11-21

## 2024-11-21 NOTE — PROGRESS NOTES
Patient ID: [id number]     Chief Complaint: Shaking and Medicare AWV Follow Up    HPI:     Aleisha Hernandez is a 84 y.o. male here today for a Medicare Wellness.     C/o tremor/shakiness. Worsening. Provoked by anxiety. Interferes with fine motor function or fine detail work.     Opioid Screening: Patient medication list reviewed, patient is not taking prescription opioids. Patient is not using additional opioids than prescribed. Patient is at low risk of substance abuse based on this opioid use history.       -------------------------------------    BPH (benign prostatic hyperplasia)    Nocturnal leg cramps        Past Surgical History:   Procedure Laterality Date    ANTERIOR CRUCIATE LIGAMENT REPAIR      Shoulder    BELPHAROPTOSIS REPAIR Bilateral 8/7/2024    Procedure: REPAIR, BLEPHAROPTOSIS;  Surgeon: Murphy Mercer MD;  Location: [facility];  Service: ENT;  Laterality: Bilateral;    BLEPHAROPLASTY, UPPER EYELID Right 8/7/2024    Procedure: BLEPHAROPLASTY, UPPER EYELID;  Surgeon: Murphy Mercer MD;  Location: [facility] OR;  Service: ENT;  Laterality: Right;    CLOSURE OF DEFECT OF MOHS PROCEDURE      KNEE ARTHROSCOPY Right     PHACOEMULSIFICATION, CATARACT, WITH IOL INSERTION Left 03/05/2024    Procedure: PHACOEMULSIFICATION, CATARACT, WITH IOL INSERTION- OS;  Surgeon: Arsalan See MD;  Location: [facility];  Service: Ophthalmology;  Laterality: Left;    PHACOEMULSIFICATION, CATARACT, WITH IOL INSERTION Right 03/26/2024    Procedure: PHACOEMULSIFICATION, CATARACT, WITH IOL INSERTION- OD;  Surgeon: Arsalan See MD;  Location: [facility] OR;  Service: Ophthalmology;  Laterality: Right;    PROSTATE SURGERY      REPAIR,BROW PTOSIS N/A 8/7/2024    Procedure: Mid-Forehead Lift, Bilateral Ptosis Repair- External Approach;  Surgeon: Murphy Mercer MD;  Location: [facility] OR;  Service: ENT;  Laterality: N/A;       Review of patient's allergies indicates:   Allergen Reactions    Codeine Itching, Palpitations, Rash and  Shortness Of Breath       Outpatient Medications Marked as Taking for the 11/21/24 encounter (Office Visit) with Murphy Canales MD   Medication Sig Dispense Refill    diltiaZEM (CARDIZEM) 30 MG tablet Take 1 tablet (30 mg total) by mouth nightly as needed (leg cramps). 90 tablet 3    zolpidem (AMBIEN) 10 mg Tab Take 1 tablet (10 mg total) by mouth nightly as needed (insomnia). 30 tablet 2       Social History     Socioeconomic History    Marital status:    Tobacco Use    Smoking status: Never    Smokeless tobacco: Never   Substance and Sexual Activity    Alcohol use: Not Currently     Alcohol/week: 1.0 standard drink of alcohol     Types: 1 Shots of liquor per week    Drug use: Never    Sexual activity: Not Currently     Social Drivers of Health     Financial Resource Strain: Low Risk  (6/10/2024)    Overall Financial Resource Strain (CARDIA)     Difficulty of Paying Living Expenses: Not hard at all   Food Insecurity: No Food Insecurity (6/10/2024)    Hunger Vital Sign     Worried About Running Out of Food in the Last Year: Never true     Ran Out of Food in the Last Year: Never true   Transportation Needs: No Transportation Needs (5/10/2024)    TRANSPORTATION NEEDS     Transportation : No   Physical Activity: Sufficiently Active (6/10/2024)    Exercise Vital Sign     Days of Exercise per Week: 4 days     Minutes of Exercise per Session: 60 min   Stress: No Stress Concern Present (6/10/2024)    [employer] - Occupational Stress Questionnaire     Feeling of Stress : Not at all   Housing Stability: Low Risk  (6/10/2024)    Housing Stability Vital Sign     Unable to Pay for Housing in the Last Year: No     Homeless in the Last Year: No        No family history on file.     Patient Care Team:  Murphy Canales MD as PCP - General (Internal Medicine)       Subjective:     Review of Systems   Constitutional:  Negative for chills and fever.   HENT:  Negative for sinus  pressure/congestion and sore throat.    Respiratory:  Negative for cough, shortness of breath and wheezing.    Cardiovascular:  Negative for chest pain and leg swelling.   Gastrointestinal:  Negative for abdominal pain, nausea and vomiting.   Genitourinary:  Negative for dysuria and hematuria.   Musculoskeletal:  Positive for arthralgias (knees, chronic). Negative for myalgias.   Integumentary:  Negative for rash.   Neurological:  Positive for tremors. Negative for dizziness and syncope.   Hematological:  Negative for adenopathy.   Psychiatric/Behavioral:  Negative for confusion. The patient is not nervous/anxious.        Patient Reported Health Risk Assessment  What is your age?: 80 or older  Are you male or female?: Male  During the past four weeks, how much have you been bothered by emotional problems such as feeling anxious, depressed, irritable, sad, or downhearted and blue?: Moderately  During the past five weeks, has your physical and/or emotional health limited your social activities with family, friends, neighbors, or groups?: Not at all  During the past four weeks, how much bodily pain have you generally had?: Moderate pain  During the past four weeks, was someone available to help if you needed and wanted help?: Yes, as much as I wanted  During the past four weeks, what was the hardest physical activity you could do for at least two minutes?: Moderate  Can you get to places out of walking distance without help?  (For example, can you travel alone on buses or taxis, or drive your own car?): No  Can you go shopping for groceries or clothes without someone's help?: Yes  Can you prepare your own meals?: Yes  Can you do your own housework without help?: Yes  Because of any health problems, do you need the help of another person with your personal care needs such as eating, bathing, dressing, or getting around the house?: No  Can you handle your own money without help?: Yes  During the past four weeks, how  "would you rate your health in general?: Excellent  How have things been going for you during the past four weeks?: Pretty well  Are you having difficulties driving your car?: No  Do you always fasten your seat belt when you are in a car?: Yes, usually  How often in the past four weeks have you been bothered by falling or dizzy when standing up?: Sometimes  How often in the past four weeks have you been bothered by sexual problems?: Never  How often in the past four weeks have you been bothered by trouble eating well?: Never  How often in the past four weeks have you been bothered by teeth or denture problems?: Never  How often in the past four weeks have you been bothered with problems using the telephone?: Never  How often in the past four weeks have you been bothered by tiredness or fatigue?: Seldom  Have you fallen two or more times in the past year?: Yes  Are you afraid of falling?: Yes  Are you a smoker?: No  During the past four weeks, how many drinks of wine, beer, or other alcoholic beverages did you have?: 2-5 drinks per weeks  Do you exercise for about 20 minutes three or more days a week?: Yes, some of the time  Have you been given any information to help you with hazards in your house that might hurt you?: Yes  Have you been given any information to help you with keeping track of your medications?: Yes  How often do you have trouble taking medicines the way you've been told to take them?: I always take them as prescribed  How confident are you that you can control and manage most of your health problems?: Very confident  What is your race? (Check all that apply.):     Objective:     BP (!) 149/81   Pulse 61   Temp 97.7 °F (36.5 °C) (Oral)   Resp 15   Ht 5' 9" (1.753 m)   Wt 72.6 kg (160 lb)   SpO2 99%   BMI 23.63 kg/m²     Physical Exam  Vitals and nursing note reviewed.   Constitutional:       General: He is not in acute distress.  HENT:      Head: Normocephalic.      Nose: No " rhinorrhea.   Eyes:      Conjunctiva/sclera: Conjunctivae normal.      Pupils: Pupils are equal, round, and reactive to light.   Cardiovascular:      Rate and Rhythm: Normal rate and regular rhythm.   Pulmonary:      Effort: Pulmonary effort is normal.      Breath sounds: Normal breath sounds.   Abdominal:      Palpations: Abdomen is soft.      Tenderness: There is no abdominal tenderness.   Musculoskeletal:         General: No deformity or signs of injury.   Skin:     General: Skin is warm and dry.   Neurological:      General: No focal deficit present.      Mental Status: He is alert. Mental status is at baseline.   Psychiatric:         Mood and Affect: Mood normal.         Behavior: Behavior normal.                No data to display                  11/21/2024    11:20 AM 8/14/2024     9:00 AM 6/17/2024     2:00 PM 5/10/2024    10:40 AM 2/28/2024     3:40 PM 10/31/2023     9:20 AM 9/27/2023     1:00 PM   Fall Risk Assessment - Outpatient   Mobility Status Ambulatory Ambulatory w/ assistance Ambulatory Ambulatory Ambulatory Ambulatory Ambulatory   Number of falls 1 1 with injury 0 0 0 0 0   Identified as fall risk False True False False False False False              Assessment/Plan:     1. Medicare annual wellness visit, subsequent  Assessment & Plan:  Reviewed recent wellness labs. See below for health maintenance.    Vaccinations:   - Flu: received 9/5/23   - Pneumonia: PCV13 2020, PPSV23 2021   - Shingles (>50): pt reports receiving x 2 2018   - Tdap: pt reports receiving 2020 for trip to [place]   - COVID: received original series   - RSV: discussed, pt declines for now  Screening:   - Prostate (>45): n/a sec to age   - Lung Ca. Screening (50-80 with >20 pack yrs current or quit <15 yrs):    - Colon Ca. Screening (>45): pt thinks last c-scope was at age 72 and was told no further needed, pt says no polyps   - AAA (65-75 if ever smoked): n/a      2. Essential tremor  Assessment & Plan:  Start trial of  propranolol ER 60 mg  Pt will monitor HR and notify MD of any HR < 55  If HR is issue consider primidone but monitor cognition    Orders:  -     propranoloL (INDERAL LA) 60 MG 24 hr capsule; Take 1 capsule (60 mg total) by mouth once daily.  Dispense: 30 capsule; Refill: 11           Medicare Annual Wellness and Personalized Prevention Plan:   Fall Risk + Home Safety + Hearing Impairment + Depression Screen + Opioid and Substance Abuse Screening + Cognitive Impairment Screen + Health Risk Assessment all reviewed.     Health Maintenance Topics with due status: Not Due       Topic Last Completion Date    TETANUS VACCINE 08/14/2024    Lipid Panel 11/19/2024      The patient's Health Maintenance was reviewed and the following appears to be due at this time:   Health Maintenance Due   Topic Date Due    Shingles Vaccine (1 of 2) Never done    RSV Vaccine (Age 60+ and Pregnant patients) (1 - 1-dose 75+ series) Never done    COVID-19 Vaccine (3 - 2024-25 season) 09/01/2024       Advance Care Planning   I attest to discussing Advance Care Planning with patient and/or family member.  Education was provided including the importance of the Health Care Power of , Advance Directives, and/or [place] documentation.  The patient expressed understanding to the importance of this information and discussion.     Advance Care Planning     Date: 11/21/2024  Patient did not wish or was not able to name a surrogate decision maker or provide an Advance Care Plan.         Follow up in about 6 months (around 5/21/2025) for HTN, Lab Review. In addition to their scheduled follow up, the patient has also been instructed to follow up on as needed basis.

## 2024-11-21 NOTE — ASSESSMENT & PLAN NOTE
Start trial of propranolol ER 60 mg  Pt will monitor HR and notify MD of any HR < 55  If HR is issue consider primidone but monitor cognition

## 2024-11-21 NOTE — ASSESSMENT & PLAN NOTE
Reviewed recent wellness labs. See below for health maintenance.    Vaccinations:   - Flu: received 9/5/23   - Pneumonia: PCV13 2020, PPSV23 2021   - Shingles (>50): pt reports receiving x 2 2018   - Tdap: pt reports receiving 2020 for trip to [place]   - COVID: received original series   - RSV: discussed, pt declines for now  Screening:   - Prostate (>45): n/a sec to age   - Lung Ca. Screening (50-80 with >20 pack yrs current or quit <15 yrs):    - Colon Ca. Screening (>45): pt thinks last c-scope was at age 72 and was told no further needed, pt says no polyps   - AAA (65-75 if ever smoked): n/a

## 2024-12-09 DIAGNOSIS — G47.00 INSOMNIA, UNSPECIFIED TYPE: Primary | ICD-10-CM

## 2024-12-09 RX ORDER — ZOLPIDEM TARTRATE 10 MG/1
10 TABLET ORAL NIGHTLY PRN
Qty: 30 TABLET | Refills: 1 | Status: SHIPPED | OUTPATIENT
Start: 2024-12-09 | End: 2025-06-09

## 2025-01-10 DIAGNOSIS — G47.00 INSOMNIA, UNSPECIFIED TYPE: Primary | ICD-10-CM

## 2025-01-10 RX ORDER — ZOLPIDEM TARTRATE 10 MG/1
10 TABLET ORAL NIGHTLY PRN
Qty: 30 TABLET | Refills: 1 | Status: SHIPPED | OUTPATIENT
Start: 2025-01-10 | End: 2025-07-11

## 2025-03-12 DIAGNOSIS — G47.00 INSOMNIA, UNSPECIFIED TYPE: ICD-10-CM

## 2025-03-12 RX ORDER — ZOLPIDEM TARTRATE 10 MG/1
10 TABLET ORAL NIGHTLY PRN
Qty: 30 TABLET | Refills: 5 | Status: SHIPPED | OUTPATIENT
Start: 2025-03-12 | End: 2025-09-10

## 2025-03-26 ENCOUNTER — OFFICE VISIT (OUTPATIENT)
Dept: PRIMARY CARE CLINIC | Facility: CLINIC | Age: 85
End: 2025-03-26
Payer: MEDICARE

## 2025-03-26 VITALS
HEIGHT: 69 IN | DIASTOLIC BLOOD PRESSURE: 88 MMHG | SYSTOLIC BLOOD PRESSURE: 157 MMHG | WEIGHT: 150 LBS | BODY MASS INDEX: 22.22 KG/M2 | OXYGEN SATURATION: 98 % | RESPIRATION RATE: 15 BRPM | HEART RATE: 57 BPM

## 2025-03-26 DIAGNOSIS — N64.4 PAIN OF LEFT BREAST: Primary | ICD-10-CM

## 2025-03-26 PROCEDURE — 99213 OFFICE O/P EST LOW 20 MIN: CPT | Mod: ,,, | Performed by: STUDENT IN AN ORGANIZED HEALTH CARE EDUCATION/TRAINING PROGRAM

## 2025-03-26 NOTE — PROGRESS NOTES
Subjective:       Patient ID: Aleisha Hernandez is a 84 y.o. male.    -------------------------------------    BPH (benign prostatic hyperplasia)    Nocturnal leg cramps        Chief Complaint: Follow-up    C/o L nipple pain. Started about 3 weeks ago. No trauma, falls, etc. No rash, redness, swelling etc, of the nipple. No discharge or drainage. No breast, chest muscle tenderness, etc. Overall, symptoms are slowly improving.      Review of Systems   Constitutional:  Negative for activity change and unexpected weight change.   HENT:  Negative for hearing loss, rhinorrhea and trouble swallowing.    Eyes:  Negative for discharge and visual disturbance.   Respiratory:  Negative for chest tightness and wheezing.    Cardiovascular:  Negative for chest pain and palpitations.   Gastrointestinal:  Negative for blood in stool, constipation, diarrhea and vomiting.   Endocrine: Negative for polydipsia and polyuria.   Genitourinary:  Negative for difficulty urinating, hematuria and urgency.   Musculoskeletal:  Negative for arthralgias, joint swelling and neck pain.   Neurological:  Negative for weakness and headaches.   Psychiatric/Behavioral:  Negative for confusion and dysphoric mood.            Objective:      Physical Exam  Vitals and nursing note reviewed.   Constitutional:       General: He is not in acute distress.  HENT:      Head: Normocephalic.      Nose: No rhinorrhea.   Eyes:      Conjunctiva/sclera: Conjunctivae normal.      Pupils: Pupils are equal, round, and reactive to light.   Cardiovascular:      Rate and Rhythm: Normal rate and regular rhythm.   Pulmonary:      Effort: Pulmonary effort is normal.      Breath sounds: Normal breath sounds.   Abdominal:      Palpations: Abdomen is soft.      Tenderness: There is no abdominal tenderness.   Musculoskeletal:         General: No deformity or signs of injury.   Skin:     General: Skin is warm and dry.      Comments: L breast exam - no asymmetry noted between R and L  breast. No areola drainage, erythema, retraction, swelling, etc. No breast tenderness, swelling, mass, etc.    Neurological:      General: No focal deficit present.      Mental Status: He is alert. Mental status is at baseline.   Psychiatric:         Mood and Affect: Mood normal.         Behavior: Behavior normal.           Assessment & Plan:   1. Pain of left breast  Assessment & Plan:  Pain is localized to L areola only. Symptoms slowly improving.     Exam is unremarkable - no erythema, masses, skin changes, nipple retraction. No tenderness on exam today.   For now will presume local neuralgia or other benign etiology of pain. Offered lidocaine cream but pt declined. He is slowly improving.   Patient instructed to continue monitoring and self breast exam. Notify MD of any worsening of pain or new swelling, discoloration, nipple changes, etc.   Next step would be mammogram + US        Keep scheduled f/u. In addition to their scheduled follow up, the patient has also been instructed to follow up on as needed basis.

## 2025-03-26 NOTE — ASSESSMENT & PLAN NOTE
Pain is localized to L areola only. Symptoms slowly improving.     Exam is unremarkable - no erythema, masses, skin changes, nipple retraction. No tenderness on exam today.   For now will presume local neuralgia or other benign etiology of pain. Offered lidocaine cream but pt declined. He is slowly improving.   Patient instructed to continue monitoring and self breast exam. Notify MD of any worsening of pain or new swelling, discoloration, nipple changes, etc.   Next step would be mammogram + US

## 2025-05-21 ENCOUNTER — OFFICE VISIT (OUTPATIENT)
Dept: PRIMARY CARE CLINIC | Facility: CLINIC | Age: 85
End: 2025-05-21
Payer: MEDICARE

## 2025-05-21 VITALS
HEART RATE: 58 BPM | SYSTOLIC BLOOD PRESSURE: 136 MMHG | DIASTOLIC BLOOD PRESSURE: 80 MMHG | BODY MASS INDEX: 21.92 KG/M2 | WEIGHT: 148 LBS | HEIGHT: 69 IN | OXYGEN SATURATION: 98 % | RESPIRATION RATE: 15 BRPM

## 2025-05-21 DIAGNOSIS — N64.4 PAIN OF LEFT BREAST: Primary | ICD-10-CM

## 2025-05-21 DIAGNOSIS — N63.0 SWELLING OF BREAST: ICD-10-CM

## 2025-05-21 DIAGNOSIS — I10 ESSENTIAL HYPERTENSION, BENIGN: ICD-10-CM

## 2025-05-21 PROCEDURE — G2211 COMPLEX E/M VISIT ADD ON: HCPCS | Mod: ,,, | Performed by: STUDENT IN AN ORGANIZED HEALTH CARE EDUCATION/TRAINING PROGRAM

## 2025-05-21 PROCEDURE — 99214 OFFICE O/P EST MOD 30 MIN: CPT | Mod: ,,, | Performed by: STUDENT IN AN ORGANIZED HEALTH CARE EDUCATION/TRAINING PROGRAM

## 2025-05-21 NOTE — PROGRESS NOTES
Subjective:       Patient ID: Aleisha Hernandez is a 84 y.o. male.    -------------------------------------    BPH (benign prostatic hyperplasia)    Nocturnal leg cramps        Chief Complaint: Follow-up    L breast pain - now present 2+ months. Slight improvement but now feels swelling behind areola.     HTN - Well controlled. Compliant with meds.      Review of Systems   Constitutional:  Negative for chills and fever.   HENT:  Negative for sinus pressure/congestion.    Respiratory:  Negative for cough and shortness of breath.    Cardiovascular:  Negative for chest pain.   Gastrointestinal:  Negative for abdominal pain and nausea.   Genitourinary:  Negative for dysuria.   Musculoskeletal:  Positive for arthralgias (knees, chronic).   Integumentary:  Negative for rash.   Neurological:  Negative for headaches.   Psychiatric/Behavioral:  The patient is not nervous/anxious.            Objective:      Physical Exam  Vitals and nursing note reviewed.   Constitutional:       General: He is not in acute distress.  HENT:      Head: Normocephalic.      Nose: No rhinorrhea.   Eyes:      Conjunctiva/sclera: Conjunctivae normal.      Pupils: Pupils are equal, round, and reactive to light.   Cardiovascular:      Rate and Rhythm: Normal rate and regular rhythm.   Pulmonary:      Effort: Pulmonary effort is normal.      Breath sounds: Normal breath sounds.   Chest:   Breasts:     Breasts are asymmetrical (Swelling behind L areola. Slight L nipple retraction).      Right: Normal.      Left: Swelling and mass (soft, poorly defined, approx 3 cm behind areola extending towards 2 o'clock position slightly) present.   Abdominal:      Palpations: Abdomen is soft.      Tenderness: There is no abdominal tenderness.   Musculoskeletal:         General: No deformity or signs of injury.   Skin:     General: Skin is warm and dry.   Neurological:      General: No focal deficit present.      Mental Status: He is alert. Mental status is at  baseline.   Psychiatric:         Mood and Affect: Mood normal.         Behavior: Behavior normal.           Assessment & Plan:   1. Pain of left breast  Assessment & Plan:  Exam has changed. Mass now present.  Symptoms have persisted.  We'll proceed with diagnostic mammogram and US    Orders:  -     Mammo Digital Diagnostic Left; Future; Expected date: 05/21/2025  -     US Breast Left Limited; Future; Expected date: 05/21/2025    2. Swelling of breast  -     Mammo Digital Diagnostic Left; Future; Expected date: 05/21/2025  -     US Breast Left Limited; Future; Expected date: 05/21/2025    3. Essential hypertension, benign  Assessment & Plan:  Controlled  Currently only taking Cardizem 30 mg before bed (for nocturnal leg cramps). Continue same            Follow up in about 6 months (around 11/21/2025) for Wellness. In addition to their scheduled follow up, the patient has also been instructed to follow up on as needed basis.

## 2025-05-22 NOTE — ASSESSMENT & PLAN NOTE
Exam has changed. Mass now present.  Symptoms have persisted.  We'll proceed with diagnostic mammogram and US

## 2025-05-22 NOTE — ASSESSMENT & PLAN NOTE
Controlled  Currently only taking Cardizem 30 mg before bed (for nocturnal leg cramps). Continue same

## 2025-06-04 ENCOUNTER — TELEPHONE (OUTPATIENT)
Dept: PRIMARY CARE CLINIC | Facility: CLINIC | Age: 85
End: 2025-06-04
Payer: MEDICARE

## 2025-07-03 ENCOUNTER — HOSPITAL ENCOUNTER (OUTPATIENT)
Dept: RADIOLOGY | Facility: HOSPITAL | Age: 85
Discharge: HOME | End: 2025-07-03
Attending: STUDENT IN AN ORGANIZED HEALTH CARE EDUCATION/TRAINING PROGRAM
Payer: MEDICARE

## 2025-07-03 DIAGNOSIS — N63.0 SWELLING OF BREAST: ICD-10-CM

## 2025-07-03 DIAGNOSIS — N64.4 PAIN OF LEFT BREAST: ICD-10-CM

## 2025-07-03 PROCEDURE — 76642 ULTRASOUND BREAST LIMITED: CPT | Mod: TC,LT

## 2025-07-03 PROCEDURE — 77062 BREAST TOMOSYNTHESIS BI: CPT | Mod: TC

## 2025-07-07 DIAGNOSIS — G47.00 INSOMNIA, UNSPECIFIED TYPE: ICD-10-CM

## 2025-07-07 RX ORDER — ZOLPIDEM TARTRATE 10 MG/1
10 TABLET ORAL NIGHTLY PRN
Qty: 30 TABLET | Refills: 5 | Status: SHIPPED | OUTPATIENT
Start: 2025-07-07 | End: 2026-01-05